# Patient Record
Sex: FEMALE | Race: WHITE | NOT HISPANIC OR LATINO | Employment: UNEMPLOYED | URBAN - NONMETROPOLITAN AREA
[De-identification: names, ages, dates, MRNs, and addresses within clinical notes are randomized per-mention and may not be internally consistent; named-entity substitution may affect disease eponyms.]

---

## 2017-05-10 ENCOUNTER — APPOINTMENT (OUTPATIENT)
Dept: GENERAL RADIOLOGY | Facility: HOSPITAL | Age: 57
End: 2017-05-10

## 2017-05-10 ENCOUNTER — HOSPITAL ENCOUNTER (EMERGENCY)
Facility: HOSPITAL | Age: 57
Discharge: LEFT BEFORE DISCHARGE | End: 2017-05-11
Attending: EMERGENCY MEDICINE | Admitting: EMERGENCY MEDICINE

## 2017-05-10 DIAGNOSIS — J44.9 CHRONIC OBSTRUCTIVE PULMONARY DISEASE, UNSPECIFIED COPD TYPE (HCC): Primary | ICD-10-CM

## 2017-05-10 DIAGNOSIS — M94.0 COSTOCHONDRITIS: ICD-10-CM

## 2017-05-10 LAB
A-A DO2: 21.7 MMHG (ref 0–300)
ALBUMIN SERPL-MCNC: 3.6 G/DL (ref 3.5–5)
ALBUMIN/GLOB SERPL: 1.3 G/DL (ref 1.5–2.5)
ALP SERPL-CCNC: 77 U/L (ref 35–104)
ALT SERPL W P-5'-P-CCNC: 15 U/L (ref 10–36)
ANION GAP SERPL CALCULATED.3IONS-SCNC: 1.2 MMOL/L (ref 3.6–11.2)
ARTERIAL PATENCY WRIST A: ABNORMAL
AST SERPL-CCNC: 18 U/L (ref 10–30)
ATMOSPHERIC PRESS: 728 MMHG
BASE EXCESS BLDA CALC-SCNC: 0.2 MMOL/L
BASOPHILS # BLD AUTO: 0.05 10*3/MM3 (ref 0–0.3)
BASOPHILS NFR BLD AUTO: 0.6 % (ref 0–2)
BDY SITE: ABNORMAL
BILIRUB SERPL-MCNC: 0.3 MG/DL (ref 0.2–1.8)
BODY TEMPERATURE: 98.6 C
BUN BLD-MCNC: 13 MG/DL (ref 7–21)
BUN/CREAT SERPL: 18.3 (ref 7–25)
CALCIUM SPEC-SCNC: 8.8 MG/DL (ref 7.7–10)
CHLORIDE SERPL-SCNC: 111 MMOL/L (ref 99–112)
CK MB SERPL-CCNC: 0.62 NG/ML (ref 0–5)
CK MB SERPL-RTO: 1 % (ref 0–3)
CK SERPL-CCNC: 64 U/L (ref 24–173)
CO2 SERPL-SCNC: 29.8 MMOL/L (ref 24.3–31.9)
COHGB MFR BLD: 8.2 % (ref 0–5)
CREAT BLD-MCNC: 0.71 MG/DL (ref 0.43–1.29)
DEPRECATED RDW RBC AUTO: 43.2 FL (ref 37–54)
EOSINOPHIL # BLD AUTO: 0.24 10*3/MM3 (ref 0–0.7)
EOSINOPHIL NFR BLD AUTO: 2.9 % (ref 0–5)
ERYTHROCYTE [DISTWIDTH] IN BLOOD BY AUTOMATED COUNT: 13 % (ref 11.5–14.5)
GFR SERPL CREATININE-BSD FRML MDRD: 86 ML/MIN/1.73
GLOBULIN UR ELPH-MCNC: 2.7 GM/DL
GLUCOSE BLD-MCNC: 93 MG/DL (ref 70–110)
HCO3 BLDA-SCNC: 24.9 MMOL/L (ref 22–26)
HCT VFR BLD AUTO: 39.2 % (ref 37–47)
HCT VFR BLD CALC: 41 % (ref 37–47)
HGB BLD-MCNC: 12.9 G/DL (ref 12–16)
HGB BLDA-MCNC: 14 G/DL (ref 12–16)
HOROWITZ INDEX BLD+IHG-RTO: 21 %
IMM GRANULOCYTES # BLD: 0.01 10*3/MM3 (ref 0–0.03)
IMM GRANULOCYTES NFR BLD: 0.1 % (ref 0–0.5)
LYMPHOCYTES # BLD AUTO: 3.1 10*3/MM3 (ref 1–3)
LYMPHOCYTES NFR BLD AUTO: 37.1 % (ref 21–51)
MCH RBC QN AUTO: 31.1 PG (ref 27–33)
MCHC RBC AUTO-ENTMCNC: 32.9 G/DL (ref 33–37)
MCV RBC AUTO: 94.5 FL (ref 80–94)
METHGB BLD QL: 0.2 % (ref 0–3)
MODALITY: ABNORMAL
MONOCYTES # BLD AUTO: 0.56 10*3/MM3 (ref 0.1–0.9)
MONOCYTES NFR BLD AUTO: 6.7 % (ref 0–10)
MYOGLOBIN SERPL-MCNC: 25 NG/ML (ref 0–109)
NEUTROPHILS # BLD AUTO: 4.39 10*3/MM3 (ref 1.4–6.5)
NEUTROPHILS NFR BLD AUTO: 52.6 % (ref 30–70)
OSMOLALITY SERPL CALC.SUM OF ELEC: 282.9 MOSM/KG (ref 273–305)
OXYHGB MFR BLDV: 87.5 % (ref 85–100)
PCO2 BLDA: 40.3 MM HG (ref 35–45)
PH BLDA: 7.41 PH UNITS (ref 7.35–7.45)
PLATELET # BLD AUTO: 221 10*3/MM3 (ref 130–400)
PMV BLD AUTO: 9.9 FL (ref 6–10)
PO2 BLDA: 73.1 MM HG (ref 80–100)
POTASSIUM BLD-SCNC: 3.6 MMOL/L (ref 3.5–5.3)
PROT SERPL-MCNC: 6.3 G/DL (ref 6–8)
RBC # BLD AUTO: 4.15 10*6/MM3 (ref 4.2–5.4)
SAO2 % BLDCOA: 95.5 % (ref 90–100)
SODIUM BLD-SCNC: 142 MMOL/L (ref 135–153)
TROPONIN I SERPL-MCNC: <0.006 NG/ML
WBC NRBC COR # BLD: 8.35 10*3/MM3 (ref 4.5–12.5)

## 2017-05-10 PROCEDURE — 83050 HGB METHEMOGLOBIN QUAN: CPT | Performed by: EMERGENCY MEDICINE

## 2017-05-10 PROCEDURE — 93005 ELECTROCARDIOGRAM TRACING: CPT | Performed by: EMERGENCY MEDICINE

## 2017-05-10 PROCEDURE — 71010 XR CHEST 1 VW: CPT | Performed by: RADIOLOGY

## 2017-05-10 PROCEDURE — 25010000002 METHYLPREDNISOLONE PER 125 MG: Performed by: EMERGENCY MEDICINE

## 2017-05-10 PROCEDURE — 84484 ASSAY OF TROPONIN QUANT: CPT | Performed by: EMERGENCY MEDICINE

## 2017-05-10 PROCEDURE — 36600 WITHDRAWAL OF ARTERIAL BLOOD: CPT | Performed by: EMERGENCY MEDICINE

## 2017-05-10 PROCEDURE — 96374 THER/PROPH/DIAG INJ IV PUSH: CPT

## 2017-05-10 PROCEDURE — 99284 EMERGENCY DEPT VISIT MOD MDM: CPT

## 2017-05-10 PROCEDURE — 82553 CREATINE MB FRACTION: CPT | Performed by: EMERGENCY MEDICINE

## 2017-05-10 PROCEDURE — 93010 ELECTROCARDIOGRAM REPORT: CPT | Performed by: INTERNAL MEDICINE

## 2017-05-10 PROCEDURE — 82375 ASSAY CARBOXYHB QUANT: CPT | Performed by: EMERGENCY MEDICINE

## 2017-05-10 PROCEDURE — 71010 HC CHEST PA OR AP: CPT

## 2017-05-10 PROCEDURE — 85025 COMPLETE CBC W/AUTO DIFF WBC: CPT | Performed by: EMERGENCY MEDICINE

## 2017-05-10 PROCEDURE — 82550 ASSAY OF CK (CPK): CPT | Performed by: EMERGENCY MEDICINE

## 2017-05-10 PROCEDURE — 82805 BLOOD GASES W/O2 SATURATION: CPT | Performed by: EMERGENCY MEDICINE

## 2017-05-10 PROCEDURE — 94799 UNLISTED PULMONARY SVC/PX: CPT

## 2017-05-10 PROCEDURE — 80053 COMPREHEN METABOLIC PANEL: CPT | Performed by: EMERGENCY MEDICINE

## 2017-05-10 PROCEDURE — 83874 ASSAY OF MYOGLOBIN: CPT | Performed by: EMERGENCY MEDICINE

## 2017-05-10 PROCEDURE — 94640 AIRWAY INHALATION TREATMENT: CPT

## 2017-05-10 RX ORDER — GABAPENTIN 800 MG/1
300 TABLET ORAL 3 TIMES DAILY
Status: ON HOLD | COMMUNITY
End: 2017-12-16

## 2017-05-10 RX ORDER — ALBUTEROL SULFATE 90 UG/1
2 AEROSOL, METERED RESPIRATORY (INHALATION) EVERY 6 HOURS PRN
Status: ON HOLD | COMMUNITY
End: 2018-06-14

## 2017-05-10 RX ORDER — ASPIRIN 81 MG/1
324 TABLET, CHEWABLE ORAL ONCE
Status: COMPLETED | OUTPATIENT
Start: 2017-05-10 | End: 2017-05-10

## 2017-05-10 RX ORDER — PHENYTOIN SODIUM 100 MG/1
300 CAPSULE, EXTENDED RELEASE ORAL
Status: ON HOLD | COMMUNITY
End: 2018-06-14

## 2017-05-10 RX ORDER — SODIUM CHLORIDE 0.9 % (FLUSH) 0.9 %
10 SYRINGE (ML) INJECTION AS NEEDED
Status: DISCONTINUED | OUTPATIENT
Start: 2017-05-10 | End: 2017-05-11 | Stop reason: HOSPADM

## 2017-05-10 RX ORDER — IPRATROPIUM BROMIDE AND ALBUTEROL SULFATE 2.5; .5 MG/3ML; MG/3ML
3 SOLUTION RESPIRATORY (INHALATION) ONCE
Status: COMPLETED | OUTPATIENT
Start: 2017-05-10 | End: 2017-05-10

## 2017-05-10 RX ORDER — BUDESONIDE AND FORMOTEROL FUMARATE DIHYDRATE 160; 4.5 UG/1; UG/1
2 AEROSOL RESPIRATORY (INHALATION)
Status: ON HOLD | COMMUNITY
End: 2017-12-16

## 2017-05-10 RX ORDER — METHYLPREDNISOLONE SODIUM SUCCINATE 125 MG/2ML
125 INJECTION, POWDER, LYOPHILIZED, FOR SOLUTION INTRAMUSCULAR; INTRAVENOUS ONCE
Status: COMPLETED | OUTPATIENT
Start: 2017-05-10 | End: 2017-05-10

## 2017-05-10 RX ORDER — ALBUTEROL SULFATE 1.25 MG/3ML
1 SOLUTION RESPIRATORY (INHALATION) EVERY 6 HOURS PRN
Status: ON HOLD | COMMUNITY
End: 2017-12-16

## 2017-05-10 RX ADMIN — IPRATROPIUM BROMIDE AND ALBUTEROL SULFATE 3 ML: .5; 3 SOLUTION RESPIRATORY (INHALATION) at 22:28

## 2017-05-10 RX ADMIN — ASPIRIN 324 MG: 81 TABLET, CHEWABLE ORAL at 22:49

## 2017-05-10 RX ADMIN — METHYLPREDNISOLONE SODIUM SUCCINATE 125 MG: 125 INJECTION, POWDER, FOR SOLUTION INTRAMUSCULAR; INTRAVENOUS at 23:57

## 2017-05-11 VITALS
DIASTOLIC BLOOD PRESSURE: 93 MMHG | TEMPERATURE: 98.6 F | SYSTOLIC BLOOD PRESSURE: 119 MMHG | HEART RATE: 73 BPM | RESPIRATION RATE: 18 BRPM | WEIGHT: 176 LBS | OXYGEN SATURATION: 98 % | BODY MASS INDEX: 28.28 KG/M2 | HEIGHT: 66 IN

## 2017-05-11 LAB
CK MB SERPL-CCNC: 0.44 NG/ML (ref 0–5)
CK MB SERPL-RTO: 0.6 % (ref 0–3)
CK SERPL-CCNC: 70 U/L (ref 24–173)
MYOGLOBIN SERPL-MCNC: 22 NG/ML (ref 0–109)
TROPONIN I SERPL-MCNC: <0.006 NG/ML

## 2017-05-11 PROCEDURE — 83874 ASSAY OF MYOGLOBIN: CPT | Performed by: EMERGENCY MEDICINE

## 2017-05-11 PROCEDURE — 96375 TX/PRO/DX INJ NEW DRUG ADDON: CPT

## 2017-05-11 PROCEDURE — 25010000002 KETOROLAC TROMETHAMINE PER 15 MG

## 2017-05-11 PROCEDURE — 82550 ASSAY OF CK (CPK): CPT | Performed by: EMERGENCY MEDICINE

## 2017-05-11 PROCEDURE — 82553 CREATINE MB FRACTION: CPT | Performed by: EMERGENCY MEDICINE

## 2017-05-11 PROCEDURE — 84484 ASSAY OF TROPONIN QUANT: CPT | Performed by: EMERGENCY MEDICINE

## 2017-05-11 RX ORDER — KETOROLAC TROMETHAMINE 30 MG/ML
30 INJECTION, SOLUTION INTRAMUSCULAR; INTRAVENOUS ONCE
Status: COMPLETED | OUTPATIENT
Start: 2017-05-11 | End: 2017-05-11

## 2017-05-11 RX ORDER — KETOROLAC TROMETHAMINE 30 MG/ML
INJECTION, SOLUTION INTRAMUSCULAR; INTRAVENOUS
Status: COMPLETED
Start: 2017-05-11 | End: 2017-05-11

## 2017-05-11 RX ADMIN — KETOROLAC TROMETHAMINE 30 MG: 30 INJECTION, SOLUTION INTRAMUSCULAR; INTRAVENOUS at 00:24

## 2017-08-15 ENCOUNTER — HOSPITAL ENCOUNTER (EMERGENCY)
Facility: HOSPITAL | Age: 57
Discharge: HOME | End: 2017-08-15
Attending: EMERGENCY MEDICINE | Admitting: EMERGENCY MEDICINE

## 2017-08-15 ENCOUNTER — APPOINTMENT (OUTPATIENT)
Dept: GENERAL RADIOLOGY | Facility: HOSPITAL | Age: 57
End: 2017-08-15

## 2017-08-15 VITALS
RESPIRATION RATE: 18 BRPM | HEART RATE: 73 BPM | SYSTOLIC BLOOD PRESSURE: 102 MMHG | WEIGHT: 176 LBS | BODY MASS INDEX: 28.28 KG/M2 | HEIGHT: 66 IN | DIASTOLIC BLOOD PRESSURE: 72 MMHG | OXYGEN SATURATION: 97 % | TEMPERATURE: 98.5 F

## 2017-08-15 DIAGNOSIS — J44.1 COPD WITH ACUTE EXACERBATION (HCC): Primary | ICD-10-CM

## 2017-08-15 LAB
ALBUMIN SERPL-MCNC: 3.2 G/DL (ref 3.5–5)
ALBUMIN/GLOB SERPL: 1.4 G/DL (ref 1.5–2.5)
ALP SERPL-CCNC: 58 U/L (ref 35–104)
ALT SERPL W P-5'-P-CCNC: 14 U/L (ref 10–36)
ANION GAP SERPL CALCULATED.3IONS-SCNC: 2.8 MMOL/L (ref 3.6–11.2)
AST SERPL-CCNC: 14 U/L (ref 10–30)
BACTERIA UR QL AUTO: ABNORMAL /HPF
BASOPHILS # BLD AUTO: 0.04 10*3/MM3 (ref 0–0.3)
BASOPHILS NFR BLD AUTO: 0.6 % (ref 0–2)
BILIRUB SERPL-MCNC: 0.3 MG/DL (ref 0.2–1.8)
BILIRUB UR QL STRIP: NEGATIVE
BUN BLD-MCNC: 11 MG/DL (ref 7–21)
BUN/CREAT SERPL: 15.7 (ref 7–25)
CALCIUM SPEC-SCNC: 7.8 MG/DL (ref 7.7–10)
CHLORIDE SERPL-SCNC: 112 MMOL/L (ref 99–112)
CK MB SERPL-CCNC: 0.35 NG/ML (ref 0–5)
CLARITY UR: ABNORMAL
CO2 SERPL-SCNC: 28.2 MMOL/L (ref 24.3–31.9)
COLOR UR: YELLOW
CREAT BLD-MCNC: 0.7 MG/DL (ref 0.43–1.29)
DEPRECATED RDW RBC AUTO: 45.2 FL (ref 37–54)
EOSINOPHIL # BLD AUTO: 0.17 10*3/MM3 (ref 0–0.7)
EOSINOPHIL NFR BLD AUTO: 2.4 % (ref 0–5)
ERYTHROCYTE [DISTWIDTH] IN BLOOD BY AUTOMATED COUNT: 13.5 % (ref 11.5–14.5)
GFR SERPL CREATININE-BSD FRML MDRD: 87 ML/MIN/1.73
GLOBULIN UR ELPH-MCNC: 2.3 GM/DL
GLUCOSE BLD-MCNC: 101 MG/DL (ref 70–110)
GLUCOSE UR STRIP-MCNC: NEGATIVE MG/DL
HCT VFR BLD AUTO: 41.2 % (ref 37–47)
HGB BLD-MCNC: 13.5 G/DL (ref 12–16)
HGB UR QL STRIP.AUTO: NEGATIVE
HYALINE CASTS UR QL AUTO: ABNORMAL /LPF
IMM GRANULOCYTES # BLD: 0.01 10*3/MM3 (ref 0–0.03)
IMM GRANULOCYTES NFR BLD: 0.1 % (ref 0–0.5)
KETONES UR QL STRIP: NEGATIVE
LEUKOCYTE ESTERASE UR QL STRIP.AUTO: NEGATIVE
LYMPHOCYTES # BLD AUTO: 2.23 10*3/MM3 (ref 1–3)
LYMPHOCYTES NFR BLD AUTO: 31.6 % (ref 21–51)
MCH RBC QN AUTO: 31.3 PG (ref 27–33)
MCHC RBC AUTO-ENTMCNC: 32.8 G/DL (ref 33–37)
MCV RBC AUTO: 95.6 FL (ref 80–94)
MONOCYTES # BLD AUTO: 0.57 10*3/MM3 (ref 0.1–0.9)
MONOCYTES NFR BLD AUTO: 8.1 % (ref 0–10)
NEUTROPHILS # BLD AUTO: 4.03 10*3/MM3 (ref 1.4–6.5)
NEUTROPHILS NFR BLD AUTO: 57.2 % (ref 30–70)
NITRITE UR QL STRIP: NEGATIVE
OSMOLALITY SERPL CALC.SUM OF ELEC: 284.5 MOSM/KG (ref 273–305)
PH UR STRIP.AUTO: 8.5 [PH] (ref 5–8)
PLATELET # BLD AUTO: 223 10*3/MM3 (ref 130–400)
PMV BLD AUTO: 9.8 FL (ref 6–10)
POTASSIUM BLD-SCNC: 3.1 MMOL/L (ref 3.5–5.3)
PROT SERPL-MCNC: 5.5 G/DL (ref 6–8)
PROT UR QL STRIP: ABNORMAL
RBC # BLD AUTO: 4.31 10*6/MM3 (ref 4.2–5.4)
RBC # UR: ABNORMAL /HPF
REF LAB TEST METHOD: ABNORMAL
SODIUM BLD-SCNC: 143 MMOL/L (ref 135–153)
SP GR UR STRIP: 1.02 (ref 1–1.03)
SQUAMOUS #/AREA URNS HPF: ABNORMAL /HPF
TROPONIN I SERPL-MCNC: <0.006 NG/ML
UROBILINOGEN UR QL STRIP: ABNORMAL
WBC NRBC COR # BLD: 7.05 10*3/MM3 (ref 4.5–12.5)
WBC UR QL AUTO: ABNORMAL /HPF

## 2017-08-15 PROCEDURE — 94640 AIRWAY INHALATION TREATMENT: CPT

## 2017-08-15 PROCEDURE — 93005 ELECTROCARDIOGRAM TRACING: CPT | Performed by: EMERGENCY MEDICINE

## 2017-08-15 PROCEDURE — 80053 COMPREHEN METABOLIC PANEL: CPT | Performed by: EMERGENCY MEDICINE

## 2017-08-15 PROCEDURE — 25010000002 HEPARIN FLUSH (PORCINE) 100 UNIT/ML SOLUTION

## 2017-08-15 PROCEDURE — 99285 EMERGENCY DEPT VISIT HI MDM: CPT

## 2017-08-15 PROCEDURE — 71020 HC CHEST PA AND LATERAL: CPT

## 2017-08-15 PROCEDURE — 81001 URINALYSIS AUTO W/SCOPE: CPT | Performed by: EMERGENCY MEDICINE

## 2017-08-15 PROCEDURE — 94799 UNLISTED PULMONARY SVC/PX: CPT

## 2017-08-15 PROCEDURE — 85025 COMPLETE CBC W/AUTO DIFF WBC: CPT | Performed by: EMERGENCY MEDICINE

## 2017-08-15 PROCEDURE — 84484 ASSAY OF TROPONIN QUANT: CPT | Performed by: EMERGENCY MEDICINE

## 2017-08-15 PROCEDURE — 96374 THER/PROPH/DIAG INJ IV PUSH: CPT

## 2017-08-15 PROCEDURE — 82553 CREATINE MB FRACTION: CPT | Performed by: EMERGENCY MEDICINE

## 2017-08-15 PROCEDURE — 71020 XR CHEST 2 VW: CPT | Performed by: RADIOLOGY

## 2017-08-15 RX ORDER — DICYCLOMINE HCL 20 MG
20 TABLET ORAL EVERY 6 HOURS
Status: ON HOLD | COMMUNITY
End: 2017-12-16

## 2017-08-15 RX ORDER — NAPROXEN 500 MG/1
500 TABLET ORAL 2 TIMES DAILY PRN
Qty: 14 TABLET | Refills: 0 | Status: ON HOLD | OUTPATIENT
Start: 2017-08-15 | End: 2017-12-16

## 2017-08-15 RX ORDER — METOCLOPRAMIDE 10 MG/1
10 TABLET ORAL
Status: ON HOLD | COMMUNITY
End: 2017-12-16

## 2017-08-15 RX ORDER — NAPROXEN 250 MG/1
500 TABLET ORAL ONCE
Status: COMPLETED | OUTPATIENT
Start: 2017-08-15 | End: 2017-08-15

## 2017-08-15 RX ORDER — ZOLPIDEM TARTRATE 10 MG/1
5 TABLET ORAL NIGHTLY PRN
Status: ON HOLD | COMMUNITY
End: 2017-12-16

## 2017-08-15 RX ORDER — CEFDINIR 300 MG/1
300 CAPSULE ORAL 2 TIMES DAILY
Status: ON HOLD | COMMUNITY
End: 2017-12-16

## 2017-08-15 RX ORDER — CLONAZEPAM 0.5 MG/1
0.5 TABLET ORAL 2 TIMES DAILY
COMMUNITY

## 2017-08-15 RX ORDER — ROPINIROLE 1 MG/1
1 TABLET, FILM COATED ORAL NIGHTLY
COMMUNITY

## 2017-08-15 RX ORDER — IPRATROPIUM BROMIDE AND ALBUTEROL SULFATE 2.5; .5 MG/3ML; MG/3ML
3 SOLUTION RESPIRATORY (INHALATION) ONCE
Status: COMPLETED | OUTPATIENT
Start: 2017-08-15 | End: 2017-08-15

## 2017-08-15 RX ORDER — SODIUM CHLORIDE 0.9 % (FLUSH) 0.9 %
10 SYRINGE (ML) INJECTION AS NEEDED
Status: DISCONTINUED | OUTPATIENT
Start: 2017-08-15 | End: 2017-08-15 | Stop reason: HOSPADM

## 2017-08-15 RX ADMIN — NAPROXEN 500 MG: 250 TABLET ORAL at 20:44

## 2017-08-15 RX ADMIN — HEPARIN SODIUM (PORCINE) LOCK FLUSH IV SOLN 100 UNIT/ML 300 UNITS: 100 SOLUTION at 21:32

## 2017-08-15 RX ADMIN — IPRATROPIUM BROMIDE AND ALBUTEROL SULFATE 3 ML: .5; 3 SOLUTION RESPIRATORY (INHALATION) at 19:38

## 2017-08-15 NOTE — ED NOTES
EKG performed by me @ 1910 and shown to Dr. Maria @ 1912.     [place] Evaristo Unger  08/15/17 1931

## 2017-08-16 NOTE — ED NOTES
Patient is leaving ED with family at this time, patient's port deaccessed prior to departure. Patient has no further needs or questions at discharge, patient verbalizes understanding of discharge instructions and importance of follow up care. Patient is alert and oriented x4, respirations are regular and unlabored, NADN.     Matthew Wilkerson RN  08/15/17 9242

## 2017-08-16 NOTE — ED PROVIDER NOTES
Subjective   Patient is a 56 y.o. female presenting with shortness of breath.   History provided by:  Patient  Shortness of Breath   Severity:  Moderate  Onset quality:  Gradual  Duration:  3 days  Progression:  Worsening  Chronicity:  Recurrent  Context: activity    Associated symptoms: chest pain, cough and wheezing    Associated symptoms: no abdominal pain and no fever        Review of Systems   Constitutional: Negative.  Negative for fever.   HENT: Negative.    Respiratory: Positive for cough, shortness of breath and wheezing.    Cardiovascular: Positive for chest pain.   Gastrointestinal: Negative.  Negative for abdominal pain.   Endocrine: Negative.    Genitourinary: Negative.  Negative for dysuria.   Skin: Negative.    Neurological: Negative.    Psychiatric/Behavioral: Negative.    All other systems reviewed and are negative.      Past Medical History:   Diagnosis Date   • COPD (chronic obstructive pulmonary disease)    • Diabetes mellitus    • Emphysema of lung    • Emphysema of lung        No Known Allergies    History reviewed. No pertinent surgical history.    History reviewed. No pertinent family history.    Social History     Social History   • Marital status: Single     Spouse name: N/A   • Number of children: N/A   • Years of education: N/A     Social History Main Topics   • Smoking status: Current Every Day Smoker     Packs/day: 0.50     Years: 25.00     Types: Cigarettes   • Smokeless tobacco: None   • Alcohol use No   • Drug use: No   • Sexual activity: Not Asked     Other Topics Concern   • None     Social History Narrative           Objective   Physical Exam   Constitutional: She is oriented to person, place, and time. She appears well-developed and well-nourished. No distress.   HENT:   Head: Normocephalic and atraumatic.   Right Ear: External ear normal.   Left Ear: External ear normal.   Nose: Nose normal.   Eyes: Conjunctivae and EOM are normal. Pupils are equal, round, and reactive to light.    Neck: Normal range of motion. Neck supple. No JVD present. No tracheal deviation present.   Cardiovascular: Normal rate, regular rhythm and normal heart sounds.    No murmur heard.  Pulmonary/Chest: She is in respiratory distress. She has wheezes. She exhibits tenderness.   Abdominal: Soft. Bowel sounds are normal. There is no tenderness.   Musculoskeletal: Normal range of motion. She exhibits no edema or deformity.   Neurological: She is alert and oriented to person, place, and time. No cranial nerve deficit.   Skin: Skin is warm and dry. No rash noted. She is not diaphoretic. No erythema. No pallor.   Psychiatric: She has a normal mood and affect. Her behavior is normal. Thought content normal.   Nursing note and vitals reviewed.      Procedures         ED Course  ED Course                  MDM    Final diagnoses:   COPD with acute exacerbation            Steven Naranjo MD  08/15/17 4319

## 2017-10-28 ENCOUNTER — HOSPITAL ENCOUNTER (EMERGENCY)
Facility: HOSPITAL | Age: 57
Discharge: HOME | End: 2017-10-28
Attending: FAMILY MEDICINE | Admitting: FAMILY MEDICINE

## 2017-10-28 ENCOUNTER — APPOINTMENT (OUTPATIENT)
Dept: GENERAL RADIOLOGY | Facility: HOSPITAL | Age: 57
End: 2017-10-28

## 2017-10-28 VITALS
SYSTOLIC BLOOD PRESSURE: 116 MMHG | TEMPERATURE: 98.4 F | RESPIRATION RATE: 18 BRPM | OXYGEN SATURATION: 97 % | DIASTOLIC BLOOD PRESSURE: 85 MMHG | BODY MASS INDEX: 28.28 KG/M2 | HEART RATE: 82 BPM | HEIGHT: 66 IN | WEIGHT: 176 LBS

## 2017-10-28 DIAGNOSIS — J44.1 COPD WITH EXACERBATION (HCC): Primary | ICD-10-CM

## 2017-10-28 LAB
ALBUMIN SERPL-MCNC: 4.3 G/DL (ref 3.5–5)
ALBUMIN/GLOB SERPL: 1.3 G/DL (ref 1.5–2.5)
ALP SERPL-CCNC: 86 U/L (ref 35–104)
ALT SERPL W P-5'-P-CCNC: 18 U/L (ref 10–36)
ANION GAP SERPL CALCULATED.3IONS-SCNC: 7.1 MMOL/L (ref 3.6–11.2)
AST SERPL-CCNC: 19 U/L (ref 10–30)
BASOPHILS # BLD AUTO: 0.02 10*3/MM3 (ref 0–0.3)
BASOPHILS NFR BLD AUTO: 0.3 % (ref 0–2)
BILIRUB SERPL-MCNC: 0.2 MG/DL (ref 0.2–1.8)
BNP SERPL-MCNC: 18 PG/ML (ref 0–100)
BUN BLD-MCNC: 10 MG/DL (ref 7–21)
BUN/CREAT SERPL: 10.9 (ref 7–25)
CALCIUM SPEC-SCNC: 9.2 MG/DL (ref 7.7–10)
CHLORIDE SERPL-SCNC: 107 MMOL/L (ref 99–112)
CO2 SERPL-SCNC: 24.9 MMOL/L (ref 24.3–31.9)
CREAT BLD-MCNC: 0.92 MG/DL (ref 0.43–1.29)
CRP SERPL-MCNC: 0.6 MG/DL (ref 0–0.99)
D-LACTATE SERPL-SCNC: 1 MMOL/L (ref 0.5–2)
DEPRECATED RDW RBC AUTO: 44 FL (ref 37–54)
EOSINOPHIL # BLD AUTO: 0 10*3/MM3 (ref 0–0.7)
EOSINOPHIL NFR BLD AUTO: 0 % (ref 0–5)
ERYTHROCYTE [DISTWIDTH] IN BLOOD BY AUTOMATED COUNT: 13.2 % (ref 11.5–14.5)
ERYTHROCYTE [SEDIMENTATION RATE] IN BLOOD: 11 MM/HR (ref 0–30)
GFR SERPL CREATININE-BSD FRML MDRD: 63 ML/MIN/1.73
GLOBULIN UR ELPH-MCNC: 3.3 GM/DL
GLUCOSE BLD-MCNC: 116 MG/DL (ref 70–110)
HCT VFR BLD AUTO: 45 % (ref 37–47)
HGB BLD-MCNC: 15.1 G/DL (ref 12–16)
IMM GRANULOCYTES # BLD: 0.03 10*3/MM3 (ref 0–0.03)
IMM GRANULOCYTES NFR BLD: 0.4 % (ref 0–0.5)
LYMPHOCYTES # BLD AUTO: 1.54 10*3/MM3 (ref 1–3)
LYMPHOCYTES NFR BLD AUTO: 22 % (ref 21–51)
MCH RBC QN AUTO: 31.4 PG (ref 27–33)
MCHC RBC AUTO-ENTMCNC: 33.6 G/DL (ref 33–37)
MCV RBC AUTO: 93.6 FL (ref 80–94)
MONOCYTES # BLD AUTO: 0.36 10*3/MM3 (ref 0.1–0.9)
MONOCYTES NFR BLD AUTO: 5.1 % (ref 0–10)
NEUTROPHILS # BLD AUTO: 5.06 10*3/MM3 (ref 1.4–6.5)
NEUTROPHILS NFR BLD AUTO: 72.2 % (ref 30–70)
OSMOLALITY SERPL CALC.SUM OF ELEC: 277.6 MOSM/KG (ref 273–305)
PLATELET # BLD AUTO: 273 10*3/MM3 (ref 130–400)
PMV BLD AUTO: 9.4 FL (ref 6–10)
POTASSIUM BLD-SCNC: 4.3 MMOL/L (ref 3.5–5.3)
PROT SERPL-MCNC: 7.6 G/DL (ref 6–8)
RBC # BLD AUTO: 4.81 10*6/MM3 (ref 4.2–5.4)
SODIUM BLD-SCNC: 139 MMOL/L (ref 135–153)
TROPONIN I SERPL-MCNC: <0.006 NG/ML
WBC NRBC COR # BLD: 7.01 10*3/MM3 (ref 4.5–12.5)

## 2017-10-28 PROCEDURE — 99285 EMERGENCY DEPT VISIT HI MDM: CPT

## 2017-10-28 PROCEDURE — 94799 UNLISTED PULMONARY SVC/PX: CPT

## 2017-10-28 PROCEDURE — 85025 COMPLETE CBC W/AUTO DIFF WBC: CPT | Performed by: FAMILY MEDICINE

## 2017-10-28 PROCEDURE — 96374 THER/PROPH/DIAG INJ IV PUSH: CPT

## 2017-10-28 PROCEDURE — 85652 RBC SED RATE AUTOMATED: CPT | Performed by: FAMILY MEDICINE

## 2017-10-28 PROCEDURE — 93010 ELECTROCARDIOGRAM REPORT: CPT | Performed by: INTERNAL MEDICINE

## 2017-10-28 PROCEDURE — 71010 XR CHEST 1 VW: CPT | Performed by: RADIOLOGY

## 2017-10-28 PROCEDURE — 83880 ASSAY OF NATRIURETIC PEPTIDE: CPT | Performed by: FAMILY MEDICINE

## 2017-10-28 PROCEDURE — 80053 COMPREHEN METABOLIC PANEL: CPT | Performed by: FAMILY MEDICINE

## 2017-10-28 PROCEDURE — 86140 C-REACTIVE PROTEIN: CPT | Performed by: FAMILY MEDICINE

## 2017-10-28 PROCEDURE — 94640 AIRWAY INHALATION TREATMENT: CPT

## 2017-10-28 PROCEDURE — 71010 HC CHEST PA OR AP: CPT

## 2017-10-28 PROCEDURE — 83605 ASSAY OF LACTIC ACID: CPT | Performed by: FAMILY MEDICINE

## 2017-10-28 PROCEDURE — 25010000002 METHYLPREDNISOLONE PER 125 MG: Performed by: FAMILY MEDICINE

## 2017-10-28 PROCEDURE — 87040 BLOOD CULTURE FOR BACTERIA: CPT | Performed by: FAMILY MEDICINE

## 2017-10-28 PROCEDURE — 93005 ELECTROCARDIOGRAM TRACING: CPT | Performed by: FAMILY MEDICINE

## 2017-10-28 PROCEDURE — 84484 ASSAY OF TROPONIN QUANT: CPT | Performed by: FAMILY MEDICINE

## 2017-10-28 RX ORDER — GUAIFENESIN AND CODEINE PHOSPHATE 100; 10 MG/5ML; MG/5ML
10 SOLUTION ORAL 4 TIMES DAILY PRN
Qty: 200 ML | Refills: 0 | Status: ON HOLD | OUTPATIENT
Start: 2017-10-28 | End: 2017-12-16

## 2017-10-28 RX ORDER — PREDNISONE 20 MG/1
20 TABLET ORAL 2 TIMES DAILY
Qty: 8 TABLET | Refills: 0 | Status: SHIPPED | OUTPATIENT
Start: 2017-10-28 | End: 2017-11-01

## 2017-10-28 RX ORDER — CODEINE PHOSPHATE AND GUAIFENESIN 10; 100 MG/5ML; MG/5ML
10 SOLUTION ORAL ONCE
Status: COMPLETED | OUTPATIENT
Start: 2017-10-28 | End: 2017-10-28

## 2017-10-28 RX ORDER — DOXYCYCLINE 100 MG/1
CAPSULE ORAL
Status: COMPLETED
Start: 2017-10-28 | End: 2017-10-28

## 2017-10-28 RX ORDER — IPRATROPIUM BROMIDE AND ALBUTEROL SULFATE 2.5; .5 MG/3ML; MG/3ML
3 SOLUTION RESPIRATORY (INHALATION) EVERY 4 HOURS PRN
Qty: 360 ML | Refills: 0 | Status: ON HOLD | OUTPATIENT
Start: 2017-10-28 | End: 2017-12-16

## 2017-10-28 RX ORDER — CETIRIZINE HYDROCHLORIDE 10 MG/1
10 TABLET ORAL ONCE
Status: COMPLETED | OUTPATIENT
Start: 2017-10-28 | End: 2017-10-28

## 2017-10-28 RX ORDER — METHYLPREDNISOLONE SODIUM SUCCINATE 125 MG/2ML
125 INJECTION, POWDER, LYOPHILIZED, FOR SOLUTION INTRAMUSCULAR; INTRAVENOUS ONCE
Status: COMPLETED | OUTPATIENT
Start: 2017-10-28 | End: 2017-10-28

## 2017-10-28 RX ORDER — IPRATROPIUM BROMIDE AND ALBUTEROL SULFATE 2.5; .5 MG/3ML; MG/3ML
3 SOLUTION RESPIRATORY (INHALATION) ONCE
Status: DISCONTINUED | OUTPATIENT
Start: 2017-10-28 | End: 2017-10-28 | Stop reason: HOSPADM

## 2017-10-28 RX ORDER — DOXYCYCLINE 100 MG/1
100 CAPSULE ORAL 2 TIMES DAILY
Qty: 20 CAPSULE | Refills: 0 | Status: SHIPPED | OUTPATIENT
Start: 2017-10-28 | End: 2017-11-07

## 2017-10-28 RX ORDER — IPRATROPIUM BROMIDE AND ALBUTEROL SULFATE 2.5; .5 MG/3ML; MG/3ML
3 SOLUTION RESPIRATORY (INHALATION) ONCE
Status: COMPLETED | OUTPATIENT
Start: 2017-10-28 | End: 2017-10-28

## 2017-10-28 RX ORDER — SODIUM CHLORIDE 0.9 % (FLUSH) 0.9 %
10 SYRINGE (ML) INJECTION AS NEEDED
Status: DISCONTINUED | OUTPATIENT
Start: 2017-10-28 | End: 2017-10-28 | Stop reason: HOSPADM

## 2017-10-28 RX ORDER — MONTELUKAST SODIUM 10 MG/1
10 TABLET ORAL NIGHTLY
Status: DISCONTINUED | OUTPATIENT
Start: 2017-10-28 | End: 2017-10-28 | Stop reason: HOSPADM

## 2017-10-28 RX ORDER — DOXYCYCLINE 100 MG/1
100 CAPSULE ORAL EVERY 12 HOURS SCHEDULED
Status: DISCONTINUED | OUTPATIENT
Start: 2017-10-28 | End: 2017-10-28 | Stop reason: HOSPADM

## 2017-10-28 RX ADMIN — IPRATROPIUM BROMIDE AND ALBUTEROL SULFATE 3 ML: .5; 3 SOLUTION RESPIRATORY (INHALATION) at 19:44

## 2017-10-28 RX ADMIN — METHYLPREDNISOLONE SODIUM SUCCINATE 125 MG: 125 INJECTION, POWDER, FOR SOLUTION INTRAMUSCULAR; INTRAVENOUS at 18:48

## 2017-10-28 RX ADMIN — CETIRIZINE HYDROCHLORIDE 10 MG: 10 TABLET ORAL at 18:48

## 2017-10-28 RX ADMIN — IPRATROPIUM BROMIDE AND ALBUTEROL SULFATE 3 ML: .5; 3 SOLUTION RESPIRATORY (INHALATION) at 18:30

## 2017-10-28 RX ADMIN — DOXYCYCLINE 100 MG: 100 CAPSULE ORAL at 20:34

## 2017-10-28 RX ADMIN — GUAIFENESIN AND CODEINE PHOSPHATE 10 ML: 100; 10 SOLUTION ORAL at 20:34

## 2017-10-28 RX ADMIN — MONTELUKAST SODIUM 10 MG: 10 TABLET ORAL at 20:43

## 2017-10-28 NOTE — ED NOTES
Called the lab, spoke with Alex. Advised that we need blood cultures. He states ok, advised that there are labels in the room.      Heather Symes  10/28/17 1953

## 2017-10-28 NOTE — ED PROVIDER NOTES
Subjective   History of Present Illness  58 y/o F here w/ one week of worsening SOA. Pt states h/o COPD and wears O2 via NC continuously. Pt states she normally wears 2L NC but has recently turned her O2 up to 3L NC. No CP or fevers/chills. Pt has been using her duoneb more since symptoms started.   Review of Systems   Constitutional: Negative for chills, fatigue and fever.   Eyes: Negative for photophobia and visual disturbance.   Respiratory: Positive for cough, shortness of breath and wheezing. Negative for choking and chest tightness.    Cardiovascular: Negative for chest pain and palpitations.   Gastrointestinal: Negative for abdominal distention and abdominal pain.   Genitourinary: Negative for difficulty urinating and dysuria.   Musculoskeletal: Negative for back pain and neck pain.   Skin: Negative for color change and pallor.   Neurological: Negative for dizziness, syncope, speech difficulty, numbness and headaches.       Past Medical History:   Diagnosis Date   • COPD (chronic obstructive pulmonary disease)    • Diabetes mellitus    • Emphysema of lung    • Emphysema of lung        No Known Allergies    History reviewed. No pertinent surgical history.    History reviewed. No pertinent family history.    Social History     Social History   • Marital status: Single     Spouse name: N/A   • Number of children: N/A   • Years of education: N/A     Social History Main Topics   • Smoking status: Current Every Day Smoker     Packs/day: 0.50     Years: 25.00     Types: Cigarettes   • Smokeless tobacco: None   • Alcohol use No   • Drug use: No   • Sexual activity: Not Asked     Other Topics Concern   • None     Social History Narrative           Objective   Physical Exam   Constitutional: She is oriented to person, place, and time. She appears well-developed and well-nourished. She is active.   HENT:   Head: Normocephalic and atraumatic.   Right Ear: Hearing and external ear normal.   Left Ear: Hearing and external  ear normal.   Nose: Nose normal.   Mouth/Throat: Uvula is midline, oropharynx is clear and moist and mucous membranes are normal.   Eyes: Conjunctivae, EOM and lids are normal. Pupils are equal, round, and reactive to light.   Neck: Trachea normal, normal range of motion, full passive range of motion without pain and phonation normal. Neck supple.   Cardiovascular: Normal rate, regular rhythm and normal heart sounds.    Pulmonary/Chest: Effort normal. She has decreased breath sounds. She has wheezes. She has no rhonchi.   Abdominal: Soft. Normal appearance. She exhibits no distension. There is no tenderness. There is no rigidity and no guarding.   Neurological: She is alert and oriented to person, place, and time. GCS eye subscore is 4. GCS verbal subscore is 5. GCS motor subscore is 6.   Skin: Skin is warm, dry and intact.   Psychiatric: She has a normal mood and affect. Her speech is normal and behavior is normal. Cognition and memory are normal.   Nursing note and vitals reviewed.      Procedures         ED Course  ED Course   Value Comment By Time   ECG 12 Lead Sinus rhythm, 106 bpm. QTc 438ms. No ST segment abnormalities concerning for STEMI. Sherman Gaffney MD 10/28 1827      CXR grossly WNL. HEART score of 3. Pt states improvement after IV steroids and duonebs. Pt given PO steroids and duoneb prescriptions to go home with along with abx prescription. Pt with no desat while in the ED.             MDM  Number of Diagnoses or Management Options  COPD with exacerbation: new and requires workup     Amount and/or Complexity of Data Reviewed  Clinical lab tests: reviewed and ordered  Tests in the radiology section of CPT®: reviewed and ordered  Tests in the medicine section of CPT®: reviewed and ordered  Independent visualization of images, tracings, or specimens: yes    Risk of Complications, Morbidity, and/or Mortality  Presenting problems: high  Diagnostic procedures: high  Management options:  high    Patient Progress  Patient progress: stable      Final diagnoses:   COPD with exacerbation            Sherman Gaffney MD  10/28/17 2049

## 2017-10-29 NOTE — ED NOTES
PT IS AAO X4 PT HAS NO SIGNS OF DISTRESS BREATHING IS EQUAL AND UNLABORED SKIN PWD     Thalia Alberts, ANDRIY  10/28/17 4514

## 2017-11-02 LAB
BACTERIA SPEC AEROBE CULT: NORMAL
BACTERIA SPEC AEROBE CULT: NORMAL

## 2017-12-16 ENCOUNTER — HOSPITAL ENCOUNTER (OUTPATIENT)
Facility: HOSPITAL | Age: 57
Setting detail: OBSERVATION
Discharge: LEFT BEFORE DISCHARGE | End: 2017-12-17
Attending: FAMILY MEDICINE | Admitting: INTERNAL MEDICINE

## 2017-12-16 ENCOUNTER — APPOINTMENT (OUTPATIENT)
Dept: CARDIOLOGY | Facility: HOSPITAL | Age: 57
End: 2017-12-16

## 2017-12-16 ENCOUNTER — APPOINTMENT (OUTPATIENT)
Dept: GENERAL RADIOLOGY | Facility: HOSPITAL | Age: 57
End: 2017-12-16

## 2017-12-16 DIAGNOSIS — R07.9 CHEST PAIN, UNSPECIFIED TYPE: Primary | ICD-10-CM

## 2017-12-16 DIAGNOSIS — J44.9 CHRONIC OBSTRUCTIVE PULMONARY DISEASE, UNSPECIFIED COPD TYPE (HCC): ICD-10-CM

## 2017-12-16 DIAGNOSIS — R03.0 ELEVATED BLOOD PRESSURE READING: ICD-10-CM

## 2017-12-16 LAB
A-A DO2: 17.8 MMHG (ref 0–300)
ALBUMIN SERPL-MCNC: 3.8 G/DL (ref 3.5–5)
ALBUMIN/GLOB SERPL: 1.5 G/DL (ref 1.5–2.5)
ALP SERPL-CCNC: 75 U/L (ref 35–104)
ALT SERPL W P-5'-P-CCNC: 13 U/L (ref 10–36)
ALT SERPL W P-5'-P-CCNC: 13 U/L (ref 10–36)
ANION GAP SERPL CALCULATED.3IONS-SCNC: 3.6 MMOL/L (ref 3.6–11.2)
APTT PPP: 20 SECONDS (ref 23.8–36.1)
ARTERIAL PATENCY WRIST A: ABNORMAL
AST SERPL-CCNC: 15 U/L (ref 10–30)
ATMOSPHERIC PRESS: 722 MMHG
B-HCG UR QL: NEGATIVE
BASE EXCESS BLDA CALC-SCNC: -0.1 MMOL/L
BASOPHILS # BLD AUTO: 0.01 10*3/MM3 (ref 0–0.3)
BASOPHILS NFR BLD AUTO: 0.1 % (ref 0–2)
BDY SITE: ABNORMAL
BH CV ECHO MEAS - % IVS THICK: 23.3 %
BH CV ECHO MEAS - % LVPW THICK: 37.9 %
BH CV ECHO MEAS - ACS: 2.1 CM
BH CV ECHO MEAS - AO ROOT AREA (BSA CORRECTED): 1.7
BH CV ECHO MEAS - AO ROOT AREA: 8.4 CM^2
BH CV ECHO MEAS - AO ROOT DIAM: 3.3 CM
BH CV ECHO MEAS - BSA(HAYCOCK): 1.9 M^2
BH CV ECHO MEAS - BSA: 1.9 M^2
BH CV ECHO MEAS - BZI_BMI: 29.3 KILOGRAMS/M^2
BH CV ECHO MEAS - BZI_METRIC_HEIGHT: 165.1 CM
BH CV ECHO MEAS - BZI_METRIC_WEIGHT: 79.8 KG
BH CV ECHO MEAS - CONTRAST EF 4CH: 73.7 ML/M^2
BH CV ECHO MEAS - EDV(CUBED): 191.8 ML
BH CV ECHO MEAS - EDV(MOD-SP4): 38 ML
BH CV ECHO MEAS - EDV(TEICH): 164.4 ML
BH CV ECHO MEAS - EF(CUBED): 66.8 %
BH CV ECHO MEAS - EF(TEICH): 57.6 %
BH CV ECHO MEAS - ESV(CUBED): 63.7 ML
BH CV ECHO MEAS - ESV(MOD-SP4): 10 ML
BH CV ECHO MEAS - ESV(TEICH): 69.7 ML
BH CV ECHO MEAS - FS: 30.8 %
BH CV ECHO MEAS - IVS/LVPW: 1
BH CV ECHO MEAS - IVSD: 1.2 CM
BH CV ECHO MEAS - IVSS: 1.5 CM
BH CV ECHO MEAS - LV DIASTOLIC VOL/BSA (35-75): 20.3 ML/M^2
BH CV ECHO MEAS - LV MASS(C)D: 290.9 GRAMS
BH CV ECHO MEAS - LV MASS(C)DI: 155.3 GRAMS/M^2
BH CV ECHO MEAS - LV MASS(C)S: 245.2 GRAMS
BH CV ECHO MEAS - LV MASS(C)SI: 130.9 GRAMS/M^2
BH CV ECHO MEAS - LV SYSTOLIC VOL/BSA (12-30): 5.3 ML/M^2
BH CV ECHO MEAS - LVIDD: 5.8 CM
BH CV ECHO MEAS - LVIDS: 4 CM
BH CV ECHO MEAS - LVLD AP4: 6.2 CM
BH CV ECHO MEAS - LVLS AP4: 4.6 CM
BH CV ECHO MEAS - LVPWD: 1.2 CM
BH CV ECHO MEAS - LVPWS: 1.6 CM
BH CV ECHO MEAS - MV A MAX VEL: 84.4 CM/SEC
BH CV ECHO MEAS - MV E MAX VEL: 67.6 CM/SEC
BH CV ECHO MEAS - MV E/A: 0.8
BH CV ECHO MEAS - RVDD: 1.2 CM
BH CV ECHO MEAS - SI(CUBED): 68.4 ML/M^2
BH CV ECHO MEAS - SI(MOD-SP4): 14.9 ML/M^2
BH CV ECHO MEAS - SI(TEICH): 50.6 ML/M^2
BH CV ECHO MEAS - SV(CUBED): 128.2 ML
BH CV ECHO MEAS - SV(MOD-SP4): 28 ML
BH CV ECHO MEAS - SV(TEICH): 94.7 ML
BILIRUB SERPL-MCNC: 0.1 MG/DL (ref 0.2–1.8)
BILIRUB UR QL STRIP: NEGATIVE
BNP SERPL-MCNC: 74 PG/ML (ref 0–100)
BODY TEMPERATURE: 98.6 C
BUN BLD-MCNC: 14 MG/DL (ref 7–21)
BUN/CREAT SERPL: 15.7 (ref 7–25)
CALCIUM SPEC-SCNC: 8.7 MG/DL (ref 7.7–10)
CHLORIDE SERPL-SCNC: 107 MMOL/L (ref 99–112)
CLARITY UR: CLEAR
CO2 SERPL-SCNC: 29.4 MMOL/L (ref 24.3–31.9)
COHGB MFR BLD: 7 % (ref 0–5)
COLOR UR: YELLOW
CREAT BLD-MCNC: 0.89 MG/DL (ref 0.43–1.29)
CRP SERPL-MCNC: <0.5 MG/DL (ref 0–0.99)
D-LACTATE SERPL-SCNC: 2.3 MMOL/L (ref 0.5–2)
D-LACTATE SERPL-SCNC: 3.4 MMOL/L (ref 0.5–2)
DEPRECATED RDW RBC AUTO: 47.2 FL (ref 37–54)
EOSINOPHIL # BLD AUTO: 0.07 10*3/MM3 (ref 0–0.7)
EOSINOPHIL NFR BLD AUTO: 0.8 % (ref 0–5)
ERYTHROCYTE [DISTWIDTH] IN BLOOD BY AUTOMATED COUNT: 13.8 % (ref 11.5–14.5)
FLUAV AG NPH QL: NEGATIVE
FLUBV AG NPH QL IA: NEGATIVE
GFR SERPL CREATININE-BSD FRML MDRD: 66 ML/MIN/1.73
GLOBULIN UR ELPH-MCNC: 2.5 GM/DL
GLUCOSE BLD-MCNC: 224 MG/DL (ref 70–110)
GLUCOSE BLDC GLUCOMTR-MCNC: 158 MG/DL (ref 70–130)
GLUCOSE BLDC GLUCOMTR-MCNC: 166 MG/DL (ref 70–130)
GLUCOSE BLDC GLUCOMTR-MCNC: 170 MG/DL (ref 70–130)
GLUCOSE UR STRIP-MCNC: NEGATIVE MG/DL
HBA1C MFR BLD: 6.1 % (ref 4.5–5.7)
HBV SURFACE AB SER RIA-ACNC: REACTIVE
HBV SURFACE AG SERPL QL IA: NORMAL
HCO3 BLDA-SCNC: 25.1 MMOL/L (ref 22–26)
HCT VFR BLD AUTO: 40.5 % (ref 37–47)
HCT VFR BLD CALC: 42 % (ref 37–47)
HCV AB SER DONR QL: REACTIVE
HEMOLYSIS INDEX: 4
HGB BLD-MCNC: 13.1 G/DL (ref 12–16)
HGB BLDA-MCNC: 14.3 G/DL (ref 12–16)
HGB UR QL STRIP.AUTO: NEGATIVE
HIV1+2 AB SER QL: NORMAL
HOLD SPECIMEN: NORMAL
HOROWITZ INDEX BLD+IHG-RTO: 21 %
IMM GRANULOCYTES # BLD: 0.05 10*3/MM3 (ref 0–0.03)
IMM GRANULOCYTES NFR BLD: 0.6 % (ref 0–0.5)
INR PPP: 0.77 (ref 0.9–1.1)
KETONES UR QL STRIP: NEGATIVE
L PNEUMO1 AG UR QL IA: NEGATIVE
LEUKOCYTE ESTERASE UR QL STRIP.AUTO: NEGATIVE
LIPASE SERPL-CCNC: 57 U/L (ref 13–60)
LYMPHOCYTES # BLD AUTO: 0.65 10*3/MM3 (ref 1–3)
LYMPHOCYTES NFR BLD AUTO: 7.8 % (ref 21–51)
MAXIMAL PREDICTED HEART RATE: 165 BPM
MCH RBC QN AUTO: 31.2 PG (ref 27–33)
MCHC RBC AUTO-ENTMCNC: 32.3 G/DL (ref 33–37)
MCV RBC AUTO: 96.4 FL (ref 80–94)
METHGB BLD QL: 0.2 % (ref 0–3)
MODALITY: ABNORMAL
MONOCYTES # BLD AUTO: 0.14 10*3/MM3 (ref 0.1–0.9)
MONOCYTES NFR BLD AUTO: 1.7 % (ref 0–10)
NEUTROPHILS # BLD AUTO: 7.46 10*3/MM3 (ref 1.4–6.5)
NEUTROPHILS NFR BLD AUTO: 89 % (ref 30–70)
NITRITE UR QL STRIP: NEGATIVE
OSMOLALITY SERPL CALC.SUM OF ELEC: 286.8 MOSM/KG (ref 273–305)
OXYHGB MFR BLDV: 88.8 % (ref 85–100)
PCO2 BLDA: 42.9 MM HG (ref 35–45)
PH BLDA: 7.38 PH UNITS (ref 7.35–7.45)
PH UR STRIP.AUTO: 6.5 [PH] (ref 5–8)
PHENYTOIN SERPL-MCNC: <0.5 MCG/ML (ref 10–20)
PLATELET # BLD AUTO: 208 10*3/MM3 (ref 130–400)
PMV BLD AUTO: 9.9 FL (ref 6–10)
PO2 BLDA: 72.6 MM HG (ref 80–100)
POTASSIUM BLD-SCNC: 4.1 MMOL/L (ref 3.5–5.3)
PROT SERPL-MCNC: 6.3 G/DL (ref 6–8)
PROT UR QL STRIP: NEGATIVE
PROTHROMBIN TIME: 10.8 SECONDS (ref 11–15.4)
RBC # BLD AUTO: 4.2 10*6/MM3 (ref 4.2–5.4)
SAO2 % BLDCOA: 95.7 % (ref 90–100)
SODIUM BLD-SCNC: 140 MMOL/L (ref 135–153)
SP GR UR STRIP: 1.01 (ref 1–1.03)
STRESS TARGET HR: 140 BPM
TROPONIN I SERPL-MCNC: 0.01 NG/ML
TROPONIN I SERPL-MCNC: <0.006 NG/ML
TSH SERPL DL<=0.05 MIU/L-ACNC: 0.08 MIU/ML (ref 0.55–4.78)
UROBILINOGEN UR QL STRIP: NORMAL
WBC NRBC COR # BLD: 8.38 10*3/MM3 (ref 4.5–12.5)

## 2017-12-16 PROCEDURE — 83690 ASSAY OF LIPASE: CPT | Performed by: FAMILY MEDICINE

## 2017-12-16 PROCEDURE — 86803 HEPATITIS C AB TEST: CPT | Performed by: INTERNAL MEDICINE

## 2017-12-16 PROCEDURE — 83036 HEMOGLOBIN GLYCOSYLATED A1C: CPT | Performed by: PHYSICIAN ASSISTANT

## 2017-12-16 PROCEDURE — 93005 ELECTROCARDIOGRAM TRACING: CPT | Performed by: PHYSICIAN ASSISTANT

## 2017-12-16 PROCEDURE — 99220 PR INITIAL OBSERVATION CARE/DAY 70 MINUTES: CPT | Performed by: HOSPITALIST

## 2017-12-16 PROCEDURE — 83605 ASSAY OF LACTIC ACID: CPT | Performed by: FAMILY MEDICINE

## 2017-12-16 PROCEDURE — G0378 HOSPITAL OBSERVATION PER HR: HCPCS

## 2017-12-16 PROCEDURE — 71010 HC CHEST PA OR AP: CPT

## 2017-12-16 PROCEDURE — 94799 UNLISTED PULMONARY SVC/PX: CPT

## 2017-12-16 PROCEDURE — 93308 TTE F-UP OR LMTD: CPT

## 2017-12-16 PROCEDURE — 86706 HEP B SURFACE ANTIBODY: CPT | Performed by: INTERNAL MEDICINE

## 2017-12-16 PROCEDURE — 87340 HEPATITIS B SURFACE AG IA: CPT | Performed by: INTERNAL MEDICINE

## 2017-12-16 PROCEDURE — 81025 URINE PREGNANCY TEST: CPT | Performed by: FAMILY MEDICINE

## 2017-12-16 PROCEDURE — 96367 TX/PROPH/DG ADDL SEQ IV INF: CPT

## 2017-12-16 PROCEDURE — 84443 ASSAY THYROID STIM HORMONE: CPT | Performed by: PHYSICIAN ASSISTANT

## 2017-12-16 PROCEDURE — 82962 GLUCOSE BLOOD TEST: CPT

## 2017-12-16 PROCEDURE — 99285 EMERGENCY DEPT VISIT HI MDM: CPT

## 2017-12-16 PROCEDURE — 83880 ASSAY OF NATRIURETIC PEPTIDE: CPT | Performed by: FAMILY MEDICINE

## 2017-12-16 PROCEDURE — 25010000002 METHYLPREDNISOLONE PER 125 MG: Performed by: FAMILY MEDICINE

## 2017-12-16 PROCEDURE — 71010 XR CHEST 1 VW: CPT | Performed by: RADIOLOGY

## 2017-12-16 PROCEDURE — 93005 ELECTROCARDIOGRAM TRACING: CPT | Performed by: FAMILY MEDICINE

## 2017-12-16 PROCEDURE — 94640 AIRWAY INHALATION TREATMENT: CPT

## 2017-12-16 PROCEDURE — 96376 TX/PRO/DX INJ SAME DRUG ADON: CPT

## 2017-12-16 PROCEDURE — 25010000002 ENOXAPARIN PER 10 MG: Performed by: PHYSICIAN ASSISTANT

## 2017-12-16 PROCEDURE — 96372 THER/PROPH/DIAG INJ SC/IM: CPT

## 2017-12-16 PROCEDURE — 25010000002 CEFTRIAXONE PER 250 MG: Performed by: FAMILY MEDICINE

## 2017-12-16 PROCEDURE — 82805 BLOOD GASES W/O2 SATURATION: CPT | Performed by: FAMILY MEDICINE

## 2017-12-16 PROCEDURE — 87040 BLOOD CULTURE FOR BACTERIA: CPT | Performed by: FAMILY MEDICINE

## 2017-12-16 PROCEDURE — 25010000002 ONDANSETRON PER 1 MG: Performed by: FAMILY MEDICINE

## 2017-12-16 PROCEDURE — 85025 COMPLETE CBC W/AUTO DIFF WBC: CPT | Performed by: FAMILY MEDICINE

## 2017-12-16 PROCEDURE — 84460 ALANINE AMINO (ALT) (SGPT): CPT | Performed by: INTERNAL MEDICINE

## 2017-12-16 PROCEDURE — 87804 INFLUENZA ASSAY W/OPTIC: CPT | Performed by: FAMILY MEDICINE

## 2017-12-16 PROCEDURE — 82375 ASSAY CARBOXYHB QUANT: CPT | Performed by: FAMILY MEDICINE

## 2017-12-16 PROCEDURE — 87449 NOS EACH ORGANISM AG IA: CPT | Performed by: PHYSICIAN ASSISTANT

## 2017-12-16 PROCEDURE — 25010000002 MORPHINE PER 10 MG: Performed by: FAMILY MEDICINE

## 2017-12-16 PROCEDURE — 84484 ASSAY OF TROPONIN QUANT: CPT | Performed by: FAMILY MEDICINE

## 2017-12-16 PROCEDURE — 80185 ASSAY OF PHENYTOIN TOTAL: CPT | Performed by: PHYSICIAN ASSISTANT

## 2017-12-16 PROCEDURE — 85610 PROTHROMBIN TIME: CPT | Performed by: FAMILY MEDICINE

## 2017-12-16 PROCEDURE — 80053 COMPREHEN METABOLIC PANEL: CPT | Performed by: FAMILY MEDICINE

## 2017-12-16 PROCEDURE — 96365 THER/PROPH/DIAG IV INF INIT: CPT

## 2017-12-16 PROCEDURE — 63710000001 INSULIN ASPART PER 5 UNITS: Performed by: PHYSICIAN ASSISTANT

## 2017-12-16 PROCEDURE — 85730 THROMBOPLASTIN TIME PARTIAL: CPT | Performed by: FAMILY MEDICINE

## 2017-12-16 PROCEDURE — 86140 C-REACTIVE PROTEIN: CPT | Performed by: FAMILY MEDICINE

## 2017-12-16 PROCEDURE — G0432 EIA HIV-1/HIV-2 SCREEN: HCPCS | Performed by: INTERNAL MEDICINE

## 2017-12-16 PROCEDURE — 83050 HGB METHEMOGLOBIN QUAN: CPT | Performed by: FAMILY MEDICINE

## 2017-12-16 PROCEDURE — 93321 DOPPLER ECHO F-UP/LMTD STD: CPT

## 2017-12-16 PROCEDURE — 36600 WITHDRAWAL OF ARTERIAL BLOOD: CPT | Performed by: FAMILY MEDICINE

## 2017-12-16 PROCEDURE — 96375 TX/PRO/DX INJ NEW DRUG ADDON: CPT

## 2017-12-16 PROCEDURE — 81003 URINALYSIS AUTO W/O SCOPE: CPT | Performed by: FAMILY MEDICINE

## 2017-12-16 PROCEDURE — 84484 ASSAY OF TROPONIN QUANT: CPT | Performed by: PHYSICIAN ASSISTANT

## 2017-12-16 PROCEDURE — 87899 AGENT NOS ASSAY W/OPTIC: CPT | Performed by: PHYSICIAN ASSISTANT

## 2017-12-16 PROCEDURE — 36415 COLL VENOUS BLD VENIPUNCTURE: CPT

## 2017-12-16 RX ORDER — METOCLOPRAMIDE 10 MG/1
10 TABLET ORAL
Status: CANCELLED | OUTPATIENT
Start: 2017-12-16

## 2017-12-16 RX ORDER — ROPINIROLE 1 MG/1
1 TABLET, FILM COATED ORAL NIGHTLY
Status: DISCONTINUED | OUTPATIENT
Start: 2017-12-16 | End: 2017-12-17 | Stop reason: HOSPADM

## 2017-12-16 RX ORDER — PHENYTOIN SODIUM 100 MG/1
300 CAPSULE, EXTENDED RELEASE ORAL DAILY
Status: DISCONTINUED | OUTPATIENT
Start: 2017-12-16 | End: 2017-12-17 | Stop reason: HOSPADM

## 2017-12-16 RX ORDER — PHENYTOIN SODIUM 100 MG/1
300 CAPSULE, EXTENDED RELEASE ORAL DAILY
Status: CANCELLED | OUTPATIENT
Start: 2017-12-16 | End: 2017-12-19

## 2017-12-16 RX ORDER — ASPIRIN 81 MG/1
324 TABLET, CHEWABLE ORAL ONCE
Status: COMPLETED | OUTPATIENT
Start: 2017-12-16 | End: 2017-12-16

## 2017-12-16 RX ORDER — ALBUTEROL SULFATE 2.5 MG/3ML
2.5 SOLUTION RESPIRATORY (INHALATION) EVERY 6 HOURS PRN
Status: ON HOLD | COMMUNITY
End: 2018-06-14

## 2017-12-16 RX ORDER — ALBUTEROL SULFATE 90 UG/1
2 AEROSOL, METERED RESPIRATORY (INHALATION) EVERY 4 HOURS PRN
Status: CANCELLED | OUTPATIENT
Start: 2017-12-16

## 2017-12-16 RX ORDER — IPRATROPIUM BROMIDE AND ALBUTEROL SULFATE 2.5; .5 MG/3ML; MG/3ML
3 SOLUTION RESPIRATORY (INHALATION)
Status: DISCONTINUED | OUTPATIENT
Start: 2017-12-16 | End: 2017-12-16

## 2017-12-16 RX ORDER — ASPIRIN 81 MG/1
81 TABLET ORAL DAILY
Status: DISCONTINUED | OUTPATIENT
Start: 2017-12-16 | End: 2017-12-16

## 2017-12-16 RX ORDER — MORPHINE SULFATE 2 MG/ML
2 INJECTION, SOLUTION INTRAMUSCULAR; INTRAVENOUS ONCE
Status: COMPLETED | OUTPATIENT
Start: 2017-12-16 | End: 2017-12-16

## 2017-12-16 RX ORDER — PANTOPRAZOLE SODIUM 40 MG/1
40 TABLET, DELAYED RELEASE ORAL EVERY MORNING
Status: DISCONTINUED | OUTPATIENT
Start: 2017-12-17 | End: 2017-12-17 | Stop reason: HOSPADM

## 2017-12-16 RX ORDER — METHYLPREDNISOLONE SODIUM SUCCINATE 125 MG/2ML
125 INJECTION, POWDER, LYOPHILIZED, FOR SOLUTION INTRAMUSCULAR; INTRAVENOUS ONCE
Status: COMPLETED | OUTPATIENT
Start: 2017-12-16 | End: 2017-12-16

## 2017-12-16 RX ORDER — ALUMINA, MAGNESIA, AND SIMETHICONE 2400; 2400; 240 MG/30ML; MG/30ML; MG/30ML
15 SUSPENSION ORAL EVERY 6 HOURS PRN
Status: DISCONTINUED | OUTPATIENT
Start: 2017-12-16 | End: 2017-12-16

## 2017-12-16 RX ORDER — L.ACID,CASEI/B.ANIMAL/S.THERMO 16B CELL
1 CAPSULE ORAL DAILY
Status: DISCONTINUED | OUTPATIENT
Start: 2017-12-16 | End: 2017-12-17 | Stop reason: HOSPADM

## 2017-12-16 RX ORDER — NITROGLYCERIN 0.4 MG/1
0.4 TABLET SUBLINGUAL
Status: CANCELLED | OUTPATIENT
Start: 2017-12-16

## 2017-12-16 RX ORDER — DEXTROSE MONOHYDRATE 25 G/50ML
25 INJECTION, SOLUTION INTRAVENOUS
Status: DISCONTINUED | OUTPATIENT
Start: 2017-12-16 | End: 2017-12-17 | Stop reason: HOSPADM

## 2017-12-16 RX ORDER — ASPIRIN 81 MG/1
81 TABLET ORAL DAILY
Status: CANCELLED | OUTPATIENT
Start: 2017-12-16

## 2017-12-16 RX ORDER — ATORVASTATIN CALCIUM 10 MG/1
10 TABLET, FILM COATED ORAL DAILY
Status: DISCONTINUED | OUTPATIENT
Start: 2017-12-16 | End: 2017-12-17 | Stop reason: HOSPADM

## 2017-12-16 RX ORDER — DOXYCYCLINE 100 MG/1
100 CAPSULE ORAL EVERY 12 HOURS SCHEDULED
Status: DISCONTINUED | OUTPATIENT
Start: 2017-12-16 | End: 2017-12-17 | Stop reason: HOSPADM

## 2017-12-16 RX ORDER — CLONAZEPAM 0.5 MG/1
0.5 TABLET ORAL 2 TIMES DAILY PRN
Status: DISCONTINUED | OUTPATIENT
Start: 2017-12-16 | End: 2017-12-17 | Stop reason: HOSPADM

## 2017-12-16 RX ORDER — ROPINIROLE 1 MG/1
1 TABLET, FILM COATED ORAL NIGHTLY
Status: CANCELLED | OUTPATIENT
Start: 2017-12-16

## 2017-12-16 RX ORDER — OMEPRAZOLE 20 MG/1
20 CAPSULE, DELAYED RELEASE ORAL DAILY
Status: ON HOLD | COMMUNITY
End: 2018-06-14

## 2017-12-16 RX ORDER — ZOLPIDEM TARTRATE 5 MG/1
5 TABLET ORAL NIGHTLY PRN
Status: CANCELLED | OUTPATIENT
Start: 2017-12-16

## 2017-12-16 RX ORDER — GABAPENTIN 100 MG/1
100 CAPSULE ORAL EVERY 12 HOURS SCHEDULED
Status: CANCELLED | OUTPATIENT
Start: 2017-12-16

## 2017-12-16 RX ORDER — BUDESONIDE AND FORMOTEROL FUMARATE DIHYDRATE 80; 4.5 UG/1; UG/1
2 AEROSOL RESPIRATORY (INHALATION)
Status: CANCELLED | OUTPATIENT
Start: 2017-12-16

## 2017-12-16 RX ORDER — ALBUTEROL SULFATE 2.5 MG/3ML
2.5 SOLUTION RESPIRATORY (INHALATION) EVERY 6 HOURS PRN
Status: CANCELLED | OUTPATIENT
Start: 2017-12-16

## 2017-12-16 RX ORDER — ASPIRIN 81 MG/1
81 TABLET ORAL DAILY
Status: ON HOLD | COMMUNITY
End: 2018-06-14

## 2017-12-16 RX ORDER — IPRATROPIUM BROMIDE AND ALBUTEROL SULFATE 2.5; .5 MG/3ML; MG/3ML
3 SOLUTION RESPIRATORY (INHALATION) ONCE
Status: COMPLETED | OUTPATIENT
Start: 2017-12-16 | End: 2017-12-16

## 2017-12-16 RX ORDER — PREDNISONE 20 MG/1
40 TABLET ORAL
Status: DISCONTINUED | OUTPATIENT
Start: 2017-12-17 | End: 2017-12-17 | Stop reason: HOSPADM

## 2017-12-16 RX ORDER — CLOPIDOGREL BISULFATE 75 MG/1
75 TABLET ORAL DAILY
Status: DISCONTINUED | OUTPATIENT
Start: 2017-12-16 | End: 2017-12-16

## 2017-12-16 RX ORDER — DICYCLOMINE HCL 20 MG
20 TABLET ORAL EVERY 6 HOURS
Status: CANCELLED | OUTPATIENT
Start: 2017-12-16

## 2017-12-16 RX ORDER — NICOTINE POLACRILEX 4 MG
15 LOZENGE BUCCAL
Status: DISCONTINUED | OUTPATIENT
Start: 2017-12-16 | End: 2017-12-17 | Stop reason: HOSPADM

## 2017-12-16 RX ORDER — GABAPENTIN 300 MG/1
300 CAPSULE ORAL 3 TIMES DAILY
COMMUNITY

## 2017-12-16 RX ORDER — ZOLPIDEM TARTRATE 5 MG/1
5 TABLET ORAL NIGHTLY
COMMUNITY

## 2017-12-16 RX ORDER — ASPIRIN 81 MG/1
81 TABLET ORAL DAILY
Status: DISCONTINUED | OUTPATIENT
Start: 2017-12-17 | End: 2017-12-17 | Stop reason: HOSPADM

## 2017-12-16 RX ORDER — GABAPENTIN 300 MG/1
300 CAPSULE ORAL 3 TIMES DAILY
Status: DISCONTINUED | OUTPATIENT
Start: 2017-12-16 | End: 2017-12-17 | Stop reason: HOSPADM

## 2017-12-16 RX ORDER — LISINOPRIL 10 MG/1
10 TABLET ORAL DAILY
COMMUNITY

## 2017-12-16 RX ORDER — ONDANSETRON 2 MG/ML
4 INJECTION INTRAMUSCULAR; INTRAVENOUS EVERY 6 HOURS PRN
Status: DISCONTINUED | OUTPATIENT
Start: 2017-12-16 | End: 2017-12-17 | Stop reason: HOSPADM

## 2017-12-16 RX ORDER — LISINOPRIL 10 MG/1
10 TABLET ORAL DAILY
Status: DISCONTINUED | OUTPATIENT
Start: 2017-12-16 | End: 2017-12-17 | Stop reason: HOSPADM

## 2017-12-16 RX ORDER — IPRATROPIUM BROMIDE AND ALBUTEROL SULFATE 2.5; .5 MG/3ML; MG/3ML
3 SOLUTION RESPIRATORY (INHALATION)
Status: DISCONTINUED | OUTPATIENT
Start: 2017-12-16 | End: 2017-12-16 | Stop reason: SDUPTHER

## 2017-12-16 RX ORDER — SODIUM CHLORIDE 0.9 % (FLUSH) 0.9 %
10 SYRINGE (ML) INJECTION AS NEEDED
Status: DISCONTINUED | OUTPATIENT
Start: 2017-12-16 | End: 2017-12-17 | Stop reason: HOSPADM

## 2017-12-16 RX ORDER — IPRATROPIUM BROMIDE AND ALBUTEROL SULFATE 2.5; .5 MG/3ML; MG/3ML
SOLUTION RESPIRATORY (INHALATION)
Status: COMPLETED
Start: 2017-12-16 | End: 2017-12-16

## 2017-12-16 RX ORDER — NICOTINE 21 MG/24HR
1 PATCH, TRANSDERMAL 24 HOURS TRANSDERMAL ONCE
Status: COMPLETED | OUTPATIENT
Start: 2017-12-16 | End: 2017-12-17

## 2017-12-16 RX ORDER — SODIUM CHLORIDE 0.9 % (FLUSH) 0.9 %
1-10 SYRINGE (ML) INJECTION AS NEEDED
Status: DISCONTINUED | OUTPATIENT
Start: 2017-12-16 | End: 2017-12-17 | Stop reason: HOSPADM

## 2017-12-16 RX ORDER — CODEINE PHOSPHATE AND GUAIFENESIN 10; 100 MG/5ML; MG/5ML
10 SOLUTION ORAL 4 TIMES DAILY PRN
Status: CANCELLED | OUTPATIENT
Start: 2017-12-16

## 2017-12-16 RX ORDER — BUDESONIDE AND FORMOTEROL FUMARATE DIHYDRATE 160; 4.5 UG/1; UG/1
2 AEROSOL RESPIRATORY (INHALATION)
Status: CANCELLED | OUTPATIENT
Start: 2017-12-16

## 2017-12-16 RX ORDER — ACETAMINOPHEN 325 MG/1
650 TABLET ORAL EVERY 6 HOURS PRN
Status: DISCONTINUED | OUTPATIENT
Start: 2017-12-16 | End: 2017-12-17 | Stop reason: HOSPADM

## 2017-12-16 RX ORDER — SODIUM CHLORIDE 0.9 % (FLUSH) 0.9 %
10 SYRINGE (ML) INJECTION AS NEEDED
Status: DISCONTINUED | OUTPATIENT
Start: 2017-12-16 | End: 2017-12-16

## 2017-12-16 RX ORDER — METHYLPREDNISOLONE SODIUM SUCCINATE 40 MG/ML
40 INJECTION, POWDER, LYOPHILIZED, FOR SOLUTION INTRAMUSCULAR; INTRAVENOUS EVERY 8 HOURS
Status: DISCONTINUED | OUTPATIENT
Start: 2017-12-16 | End: 2017-12-16

## 2017-12-16 RX ORDER — ONDANSETRON 2 MG/ML
4 INJECTION INTRAMUSCULAR; INTRAVENOUS ONCE
Status: COMPLETED | OUTPATIENT
Start: 2017-12-16 | End: 2017-12-16

## 2017-12-16 RX ORDER — ZOLPIDEM TARTRATE 5 MG/1
5 TABLET ORAL NIGHTLY PRN
Status: DISCONTINUED | OUTPATIENT
Start: 2017-12-16 | End: 2017-12-17 | Stop reason: HOSPADM

## 2017-12-16 RX ORDER — NITROGLYCERIN 0.4 MG/1
0.4 TABLET SUBLINGUAL
Status: ON HOLD | COMMUNITY
End: 2017-12-16

## 2017-12-16 RX ORDER — IPRATROPIUM BROMIDE AND ALBUTEROL SULFATE 2.5; .5 MG/3ML; MG/3ML
3 SOLUTION RESPIRATORY (INHALATION)
Status: DISCONTINUED | OUTPATIENT
Start: 2017-12-16 | End: 2017-12-17 | Stop reason: HOSPADM

## 2017-12-16 RX ORDER — CLONAZEPAM 0.5 MG/1
0.5 TABLET ORAL 2 TIMES DAILY PRN
Status: CANCELLED | OUTPATIENT
Start: 2017-12-16

## 2017-12-16 RX ADMIN — PHENYTOIN SODIUM 300 MG: 100 CAPSULE, EXTENDED RELEASE ORAL at 20:11

## 2017-12-16 RX ADMIN — ROPINIROLE HYDROCHLORIDE 1 MG: 1 TABLET, FILM COATED ORAL at 20:11

## 2017-12-16 RX ADMIN — GABAPENTIN 300 MG: 300 CAPSULE ORAL at 21:09

## 2017-12-16 RX ADMIN — NICOTINE 1 PATCH: 21 PATCH TRANSDERMAL at 06:17

## 2017-12-16 RX ADMIN — MORPHINE SULFATE 2 MG: 2 INJECTION, SOLUTION INTRAMUSCULAR; INTRAVENOUS at 05:03

## 2017-12-16 RX ADMIN — ASPIRIN 324 MG: 81 TABLET, CHEWABLE ORAL at 04:58

## 2017-12-16 RX ADMIN — MORPHINE SULFATE 2 MG: 2 INJECTION, SOLUTION INTRAMUSCULAR; INTRAVENOUS at 07:28

## 2017-12-16 RX ADMIN — DOXYCYCLINE 100 MG: 100 INJECTION, POWDER, LYOPHILIZED, FOR SOLUTION INTRAVENOUS at 06:07

## 2017-12-16 RX ADMIN — IPRATROPIUM BROMIDE AND ALBUTEROL SULFATE 3 ML: .5; 3 SOLUTION RESPIRATORY (INHALATION) at 19:01

## 2017-12-16 RX ADMIN — ATORVASTATIN CALCIUM 10 MG: 10 TABLET, FILM COATED ORAL at 15:00

## 2017-12-16 RX ADMIN — CEFTRIAXONE 2 G: 2 INJECTION, SOLUTION INTRAVENOUS at 05:29

## 2017-12-16 RX ADMIN — ENOXAPARIN SODIUM 40 MG: 40 INJECTION SUBCUTANEOUS at 14:59

## 2017-12-16 RX ADMIN — METOPROLOL TARTRATE 25 MG: 25 TABLET, FILM COATED ORAL at 15:00

## 2017-12-16 RX ADMIN — CLONAZEPAM 0.5 MG: 0.5 TABLET ORAL at 17:05

## 2017-12-16 RX ADMIN — GABAPENTIN 300 MG: 300 CAPSULE ORAL at 17:05

## 2017-12-16 RX ADMIN — IPRATROPIUM BROMIDE AND ALBUTEROL SULFATE 3 ML: .5; 3 SOLUTION RESPIRATORY (INHALATION) at 06:55

## 2017-12-16 RX ADMIN — DOXYCYCLINE 100 MG: 100 CAPSULE ORAL at 21:10

## 2017-12-16 RX ADMIN — ASPIRIN 324 MG: 81 TABLET, CHEWABLE ORAL at 16:46

## 2017-12-16 RX ADMIN — IPRATROPIUM BROMIDE AND ALBUTEROL SULFATE 3 ML: .5; 3 SOLUTION RESPIRATORY (INHALATION) at 04:22

## 2017-12-16 RX ADMIN — LISINOPRIL 10 MG: 10 TABLET ORAL at 20:10

## 2017-12-16 RX ADMIN — INSULIN ASPART 2 UNITS: 100 INJECTION, SOLUTION INTRAVENOUS; SUBCUTANEOUS at 17:05

## 2017-12-16 RX ADMIN — IPRATROPIUM BROMIDE AND ALBUTEROL SULFATE 3 ML: 2.5; .5 SOLUTION RESPIRATORY (INHALATION) at 04:22

## 2017-12-16 RX ADMIN — METHYLPREDNISOLONE SODIUM SUCCINATE 125 MG: 125 INJECTION, POWDER, FOR SOLUTION INTRAMUSCULAR; INTRAVENOUS at 05:03

## 2017-12-16 RX ADMIN — ZOLPIDEM TARTRATE 5 MG: 5 TABLET ORAL at 21:09

## 2017-12-16 RX ADMIN — ONDANSETRON 4 MG: 2 INJECTION, SOLUTION INTRAMUSCULAR; INTRAVENOUS at 05:04

## 2017-12-16 RX ADMIN — ACETAMINOPHEN 650 MG: 325 TABLET ORAL at 21:10

## 2017-12-16 NOTE — PLAN OF CARE
Problem: Diabetes, Type 2 (Adult)  Goal: Signs and Symptoms of Listed Potential Problems Will be Absent or Manageable (Diabetes, Type 2)  Outcome: Ongoing (interventions implemented as appropriate)    12/16/17 1027   Diabetes, Type 2   Problems Assessed (Type 2 Diabetes) all   Problems Present (Type 2 Diabetes) none         Problem: Pain, Acute (Adult)  Goal: Identify Related Risk Factors and Signs and Symptoms  Outcome: Ongoing (interventions implemented as appropriate)  Goal: Acceptable Pain Control/Comfort Level  Outcome: Ongoing (interventions implemented as appropriate)    Problem: Acute Coronary Syndrome (ACS) (Adult)  Goal: Signs and Symptoms of Listed Potential Problems Will be Absent or Manageable (Acute Coronary Syndrome)  Outcome: Ongoing (interventions implemented as appropriate)    Problem: Cardiac Output, Decreased (Adult)  Goal: Identify Related Risk Factors and Signs and Symptoms  Outcome: Ongoing (interventions implemented as appropriate)  Goal: Adequate Cardiac Output/Effective Tissue Perfusion  Outcome: Ongoing (interventions implemented as appropriate)

## 2017-12-16 NOTE — ED NOTES
Unsuccessful attempt to regain IV access x 3 in L forearm, L upper arm, and R AC.     Thalia Correa RN  12/16/17 7350

## 2017-12-16 NOTE — CONSULTS
Referring Provider: -    Primary care provider-unknown    Reason for Consultation: -Chest pain    Chief complaint     Chest pain and shortness of breath      History of present illness:      57-year-old woman with history of COPD has been admitted with history of increasing shortness of breath associated with cough with the diagnosis of possible pneumonia and exacerbation of COPD.    I have been consulted because of chest pain.  She has history of chest pain for the last few months.  She said that she visited emergency department at least 3 times with history of chest pain in the recent past.  She was seen by cardiologist at [place], [place] and she was unable to perform stress test because of COPD.  Her chest pain is retrosternal, pressure-like, moderate in intensity, exertional, with radiation to the neck and left shoulder, not associated with diaphoresis.  First set of cardiac marker-negative and ECG was normal.    She has history of intermittent palpitations and occasional dizziness.  No history of syncope.  She has history of dyspnea on exertion due to COPD.  No leg edema.    No history of known cardiac illness.  No history of known prior MI, CAD or CHF.    Cardiac risk factors-hypertension, hyperlipidemia, DM type II on smoking.  She also has family history of heart disease.    Review of Systems     Constitutional-tiredness next an ENT-none  Cardiovascular-as above  Pituitary-as above  GI-stomach problem  Endocrine-DM 2 and lipid disorder  Musculoskeletal-pain syndrome  Psychiatric-anxiety  Review of father organ system involvement-negative    History  Past Medical History:   Diagnosis Date   • Arthritis    • Asthma    • Chest pain    • COPD (chronic obstructive pulmonary disease)    • Depression    • Diabetes mellitus    • Emphysema of lung    • Emphysema of lung    • Injury of back    • Migraine    • Pneumonia    • Seizures    • Stroke    , Past Surgical History:   Procedure Laterality Date   •  "KIDNEY SURGERY      cyst removal   • PORTACATH PLACEMENT      left subclavian   • SINUS SURGERY     , History reviewed. No pertinent family history., Social History   Substance Use Topics   • Smoking status: Current Every Day Smoker     Packs/day: 0.50     Years: 25.00     Types: Cigarettes   • Smokeless tobacco: Never Used   • Alcohol use No   ,     Medication Review:      aspirin 324 mg Oral Once   And      [START ON 12/17/2017] aspirin 81 mg Oral Daily   atorvastatin 10 mg Oral Daily   [START ON 12/17/2017] ceftriaxone 1 g Intravenous Q24H   doxycycline 100 mg Intravenous Q12H   enoxaparin 40 mg Subcutaneous Q24H   insulin aspart 0-7 Units Subcutaneous 4x Daily AC & at Bedtime   ipratropium-albuterol 3 mL Nebulization 4x Daily - RT   methylPREDNISolone sodium succinate 40 mg Intravenous Q8H   metoprolol tartrate 25 mg Oral Q12H   nicotine 1 patch Transdermal Once   Risaquad-2 1 capsule Oral Daily        Allergies:  Review of patient's allergies indicates no known allergies.    Objective     Vital Sign Min/Max for last 24 hours  Temp  Min: 97.7 °F (36.5 °C)  Max: 97.9 °F (36.6 °C)   BP  Min: 106/79  Max: 144/87   Pulse  Min: 92  Max: 121   Resp  Min: 17  Max: 28   SpO2  Min: 91 %  Max: 98 %   No Data Recorded   Weight  Min: 79.8 kg (176 lb)  Max: 79.8 kg (176 lb)     Flowsheet Rows         First Filed Value    Admission Height  165.1 cm (65\") Documented at 12/16/2017 0358    Admission Weight  79.8 kg (176 lb) Documented at 12/16/2017 0358             Physical Exam:     General Appearance:    Alert, cooperative, in no acute distress   Head:    Normocephalic, without obvious abnormality, atraumatic   Eyes:            Lids and lashes normal, conjunctivae and sclerae normal, no   icterus, no pallor, corneas clear, PERRLA   Ears:    Ears appear intact with no abnormalities noted   Throat:   No oral lesions, no thrush, oral mucosa moist   Neck:   No adenopathy, supple, trachea midline, no thyromegaly, no   carotid " bruit, no JVD   Back:     No kyphosis present, no scoliosis present, no skin lesions,      erythema or scars, no tenderness to percussion or                   palpation,   range of motion normal   Lungs:     Bilateral equal air entry.  Bilateral wheezes were heard.      Heart:    Regular rhythm and normal rate, normal S1 and S2, no            murmur, no gallop, no rub, no click   Chest Wall:    No abnormalities observed   Abdomen:     Normal bowel sounds, no masses, no organomegaly, soft        non-tender, non-distended, no guarding, no rebound                tenderness   Rectal:     Deferred   Extremities:   Moves all extremities well, no edema, no cyanosis, no             redness   Pulses:   Pulses palpable and equal bilaterally   Skin:   No bleeding, bruising or rash   Lymph nodes:   No palpable adenopathy   Neurologic:   Cranial nerves 2 - 12 grossly intact, sensation intact, DTR       present and equal bilaterally       Telemetry  Normal sinus rhythm.  Heart rate 90-1 100 bpm    ECG  ECG/EMG Results (last 24 hours)     Procedure Component Value Units Date/Time    ECG 12 Lead [[id number]] Collected:  12/16/17 0456     Updated:  12/16/17 0937    Narrative:       Test Reason : soa  Blood Pressure : **/** mmHG  Vent. Rate : 106 BPM     Atrial Rate : 106 BPM     P-R Int : 136 ms          QRS Dur : 076 ms      QT Int : 332 ms       P-R-T Axes : 076 -29 050 degrees     QTc Int : 441 ms    Sinus tachycardia  Possible Left atrial enlargement  Borderline ECG  Confirmed by Oswaldo Sparks ([id number]) on 12/16/2017 9:37:03 AM    Referred By:  SALNIA           Confirmed By:Oswaldo Sparks    ECG 12 Lead [[id number]] Collected:  12/16/17 1430     Updated:  12/16/17 1431    Narrative:       Test Reason : chest pain  Blood Pressure : **/** mmHG  Vent. Rate : 097 BPM     Atrial Rate : 097 BPM     P-R Int : 132 ms          QRS Dur : 076 ms      QT Int : 338 ms       P-R-T Axes : 083 -20 045 degrees     QTc Int : 429 ms    Normal sinus  rhythm  Normal ECG  When compared with ECG of 16-DEC-2017 04:56,  No significant change was found    Referred By:  BRETT           Confirmed By:     Adult Transthoracic Echo Limited W/ Cont if Necessary Per Protocol [[id number]] Collected:  12/16/17 1133     Updated:  12/16/17 1507     BSA 1.9 m^2       CV ECHO JONAH - RVDD 1.2 cm      IVSd 1.2 cm      IVSs 1.5 cm      LVIDd 5.8 cm      LVIDs 4.0 cm      LVPWd 1.2 cm       CV ECHO JONAH - LVPWS 1.6 cm      IVS/LVPW 1.0     FS 30.8 %      EDV(Teich) 164.4 ml      ESV(Teich) 69.7 ml      EF(Teich) 57.6 %      EDV(cubed) 191.8 ml      ESV(cubed) 63.7 ml      EF(cubed) 66.8 %      % IVS thick 23.3 %      % LVPW thick 37.9 %      LV mass(C)d 290.9 grams      LV mass(C)dI 155.3 grams/m^2      LV mass(C)s 245.2 grams      LV mass(C)sI 130.9 grams/m^2      SV(Teich) 94.7 ml      SI(Teich) 50.6 ml/m^2      SV(cubed) 128.2 ml      SI(cubed) 68.4 ml/m^2      Ao root diam 3.3 cm      Ao root area 8.4 cm^2      ACS 2.1 cm      LVLd ap4 6.2 cm      EDV(MOD-sp4) 38.0 ml      LVLs ap4 4.6 cm      ESV(MOD-sp4) 10.0 ml      SV(MOD-sp4) 28.0 ml      SI(MOD-sp4) 14.9 ml/m^2      Ao root area (BSA corrected) 1.7     CONTRAST EF 4CH 73.7 ml/m^2      LV Diastolic corrected for BSA 20.3 ml/m^2      LV Systolic corrected for BSA 5.3 ml/m^2      MV E max yajaira 67.6 cm/sec      MV A max yajaira 84.4 cm/sec      MV E/A 0.8      CV ECHO JONAH - BZI_BMI 29.3 kilograms/m^2       CV ECHO JONAH - BSA(HAYCOCK) 1.9 m^2       CV ECHO JONAH - BZI_METRIC_WEIGHT 79.8 kg       CV ECHO JONAH - BZI_METRIC_HEIGHT 165.1 cm      Target HR (85%) 140 bpm      Max. Pred. HR (100%) 165 bpm     Narrative:       · This is a limited echocardiographic study. No Doppler and color-flow   imaging studies are done  · The left ventricular cavity is mildly dilated.  · Left ventricular systolic function is normal. Estimated EF = 55-60%.  · left ventricular wall segments contract normally.  · Left ventricular diastolic  dysfunction (grade I) consistent with   impaired relaxation.  · Normal chamber dimensions  · Normal valve morphology  · There is no evidence of pericardial effusion.               Labs    Results from last 7 days  Lab Units 12/16/17  0435   WBC 10*3/mm3 8.38   HEMOGLOBIN g/dL 13.1   HEMATOCRIT % 40.5   PLATELETS 10*3/mm3 208       Results from last 7 days  Lab Units 12/16/17  0435   SODIUM mmol/L 140   POTASSIUM mmol/L 4.1   CHLORIDE mmol/L 107   CO2 mmol/L 29.4   BUN mg/dL 14   CREATININE mg/dL 0.89   CALCIUM mg/dL 8.7   GLUCOSE mg/dL 224*       Results from last 7 days  Lab Units 12/16/17  1503 12/16/17  0435   BILIRUBIN mg/dL  --  0.1*   ALK PHOS U/L  --  75   AST (SGOT) U/L  --  15   ALT (SGPT) U/L 13 13           Results from last 7 days  Lab Units 12/16/17  0435   INR  0.77*       Results from last 7 days  Lab Units 12/16/17  0435   CRP mg/dL <0.50       Results from last 7 days  Lab Units 12/16/17  1231 12/16/17  0712 12/16/17  0435   TROPONIN I ng/mL 0.009 <0.006 <0.006       Results from last 7 days  Lab Units 12/16/17  0422   PH, ARTERIAL pH units 7.385   PO2 ART mm Hg 72.6*   PCO2, ARTERIAL mm Hg 42.9   HCO3 ART mmol/L 25.1         Imaging  Imaging Results (last 24 hours)     Procedure Component Value Units Date/Time    XR Chest 1 View [[id number]] Collected:  12/16/17 1025     Updated:  12/16/17 1027    Narrative:       XR CHEST 1 VW-     CLINICAL INDICATION: soa          COMPARISON: 10/28/2017      TECHNIQUE: Single frontal view of the chest.     FINDINGS:     There is no focal alveolar infiltrate or effusion.  The cardiac silhouette is normal. The pulmonary vasculature is  unremarkable.  There is no evidence of an acute osseous abnormality.   There are no suspicious-appearing parenchymal soft tissue nodules.            Impression:       No evidence of active or acute cardiopulmonary disease on today's chest  radiograph.         This report was finalized on 12/16/2017 10:25 AM by Dr. Wilfrid Purcell MD.                Assessment     1.  Chest pain.  With both typical and atypical features for angina.  Rule out MI.  2.  Multiple cardiac risk factors-DM type II, hypertension, hyperlipidemia and smoking  3.  Exacerbation of COPD/possible pneumonia      Plan    1.  Medications-continue aspirin, atorvastatin and metoprolol.  2.  Reviewed echocardiographic study-as above  3.  Scheduled for Lexiscan myocardial perfusion study for tomorrow if MI is ruled out.  4.  She was instructed to stop smoking.    I discussed the patients findings and my recommendations with patient     Thanks Dr. Truong for the consult     Oswaldo Sparks MD  12/16/17  3:35 PM

## 2017-12-16 NOTE — ED PROVIDER NOTES
Subjective   History of Present Illness    Review of Systems    Past Medical History:   Diagnosis Date   • Arthritis    • Asthma    • Chest pain    • COPD (chronic obstructive pulmonary disease)    • Depression    • Diabetes mellitus    • Emphysema of lung    • Emphysema of lung    • Injury of back    • Migraine    • Pneumonia    • Seizures    • Stroke        No Known Allergies    Past Surgical History:   Procedure Laterality Date   • KIDNEY SURGERY      cyst removal   • PORTACATH PLACEMENT      left subclavian   • SINUS SURGERY         History reviewed. No pertinent family history.    Social History     Social History   • Marital status: Single     Spouse name: N/A   • Number of children: N/A   • Years of education: N/A     Social History Main Topics   • Smoking status: Current Every Day Smoker     Packs/day: 0.50     Years: 25.00     Types: Cigarettes   • Smokeless tobacco: Never Used   • Alcohol use No   • Drug use: No   • Sexual activity: Defer     Other Topics Concern   • None     Social History Narrative   • None           Objective   Physical Exam    Procedures         ED Course  ED Course   Value Comment By Time   ECG 12 Lead EKG interpretation time 5:00 sinus tachycardia 106 bpm nonspecific ST changes  QTc is 441 Sachi Richdix, DO 12/16 6411    Pt presents to ED with COPD exacerbation- has been on prednisone and antibiotics but hasnt been feeling better- giving IV steroids, antibiotics, - repeat troponin pending - endorsed to Dr Strange at shift change. Sachi Julian Crow, DO 12/16 4737                Patient subsequently evaluated and reports that she had substernal chest pain along with shortness of breath this morning prior to arrival.  Patient EKG shows sinus tachycardia.  No ST elevation appreciated.  Patient denies recent cardiac workup in the last year.  Patient hemodynamically stable at this time.  Repeat examination shows an no wheezing appreciated.  Patient denies any chest pain.   She is HEART score of 4. Have contacted Dr. Truong who is agreeable to admit to hospital.   MDM    Final diagnoses:   Chest pain, unspecified type   Elevated blood pressure reading   Chronic obstructive pulmonary disease, unspecified COPD type            Booker Strange MD  12/16/17 4605

## 2017-12-16 NOTE — ED NOTES
Unsuccessful attempt to start IV x 2, RFA and LAC.  Pt tolerates well, site care per protocol.  Dr Strange made aware.     Rima Elam RN  12/16/17 2540

## 2017-12-16 NOTE — ED PROVIDER NOTES
Subjective   Patient is a 57 y.o. female presenting with shortness of breath.   History provided by:  Patient  Shortness of Breath   Severity:  Moderate  Onset quality:  Gradual  Timing:  Constant  Progression:  Worsening  Chronicity:  Recurrent  Context: activity and URI    Relieved by:  Nothing  Worsened by:  Activity and coughing  Ineffective treatments:  Oxygen (antibiotics and steroids)  Associated symptoms: abdominal pain, chest pain, cough, sputum production and wheezing    Associated symptoms: no headaches, no neck pain, no rash and no vomiting    Risk factors: obesity and tobacco use        Review of Systems   Constitutional: Negative for activity change, appetite change, chills and fatigue.   HENT: Negative for congestion.    Eyes: Negative for pain.   Respiratory: Positive for cough, sputum production, shortness of breath and wheezing. Negative for stridor.    Cardiovascular: Positive for chest pain.   Gastrointestinal: Positive for abdominal pain. Negative for diarrhea, nausea and vomiting.   Genitourinary: Negative for dysuria.   Musculoskeletal: Negative for arthralgias, myalgias, neck pain and neck stiffness.   Skin: Negative for rash.   Neurological: Negative for dizziness, syncope, speech difficulty, weakness and headaches.   Psychiatric/Behavioral: Negative for agitation.       Past Medical History:   Diagnosis Date   • Arthritis    • Asthma    • Chest pain    • COPD (chronic obstructive pulmonary disease)    • Depression    • Diabetes mellitus    • Emphysema of lung    • Emphysema of lung    • Injury of back    • Migraine    • Pneumonia    • Seizures    • Stroke        No Known Allergies    Past Surgical History:   Procedure Laterality Date   • KIDNEY SURGERY      cyst removal   • PORTACATH PLACEMENT      left subclavian   • SINUS SURGERY         History reviewed. No pertinent family history.    Social History     Social History   • Marital status: Single     Spouse name: N/A   • Number of  children: N/A   • Years of education: N/A     Social History Main Topics   • Smoking status: Current Every Day Smoker     Packs/day: 0.50     Years: 25.00     Types: Cigarettes   • Smokeless tobacco: Never Used   • Alcohol use No   • Drug use: No   • Sexual activity: Defer     Other Topics Concern   • None     Social History Narrative   • None           Objective   Physical Exam   Constitutional: She is oriented to person, place, and time. She appears well-developed and well-nourished.   HENT:   Head: Normocephalic and atraumatic.   Right Ear: External ear normal.   Left Ear: External ear normal.   Nose: Nose normal.   Mouth/Throat: Oropharynx is clear and moist.   Eyes: EOM are normal.   Neck: Neck supple.   Cardiovascular: Normal rate.    Pulmonary/Chest: Effort normal. She has wheezes.   Abdominal: Soft. There is tenderness.   Musculoskeletal: Normal range of motion.   Neurological: She is alert and oriented to person, place, and time.   Skin: Skin is warm.   Psychiatric: She has a normal mood and affect. Her behavior is normal. Judgment and thought content normal.   Nursing note and vitals reviewed.      Procedures         ED Course  ED Course   Value Comment By Time   ECG 12 Lead EKG interpretation time 5:00 sinus tachycardia 106 bpm nonspecific ST changes  QTc is 441 Sachi Maria, DO 12/16 0522    Pt presents to ED with COPD exacerbation- has been on prednisone and antibiotics but hasnt been feeling better- giving IV steroids, antibiotics, - repeat troponin pending - endorsed to Dr tSrange at shift change. Sachi Maria, DO 12/16 0713                  [facility]    Final diagnoses:   Chest pain, unspecified type   Elevated blood pressure reading   Chronic obstructive pulmonary disease, unspecified COPD type            Sachi Maria, DO  12/16/17 6676

## 2017-12-16 NOTE — H&P
[facility] HOSPITALIST HISTORY AND PHYSICAL    Patient Identification:  Name:  Marissa Lau  Age:  57 y.o.  Sex:  female  :  1960  MRN:  [id number]   Visit Number:  [id number]  Primary Care Physician:  No Known Provider Latoya Nurse Practitioner       Chief complaint: Shortness of breath     History of presenting illness: Patient is a 57 y.o. female with a past medical history of COPD, seizure disorder, h/o stroke with residual LLE weakness and aphasia, HLD, NIDDM Type II DM, Anxiety, who reports that over the course of the last week she has been feeling ill. She has been experiencing subjective fever with chills/rigors. She has experienced cough with production of gray/brown sputum, with no reports of hemoptysis. She become increasingly short of breath during this time, particularly with any sort of exertion. She only wears oxygen as needed during the day but does wear at night. She has episodic chest pain that can be provoked with activity or occur at rest. She describes this pain as pressure in sensation, with radiation into the neck and accompanying shortness of breath and sweating episodes. She tells me she had a stress done approximately 3 years ago (treadmill) and was unable to finish this because she got hypoxic and had to be placed in ICU. She believes she had an ECHO at that time, but results are unknown. No history of heart cath. She has occassional palpitations with epiodes of dizzyness. She got up last night to go to the bathroom and experienced worsening dyspnea and chest pressure, along with dizziness. She thought she was going to pass out but fortunately was able to gather herself and sit down, which alleviated her symptoms. She gets these episodes from time to time and has not been evaluated for them. She uses a cane for assistance with her residual weakness from her CVA. Her last seizure was less than 6 months ago. She has never seen a neurologist but has been taking  dilantin for seizure control.    She is feeling better now than when she initially came in today. She is able to carry out a conversation without much shortness of breath. She has continued to ask for pain medication since her arrival to the telemetry floor. However, when I first entered her room to see her, she was sleeping soundly in no distress. Furthermore, she was groggy for a while after waking up. Her nurse stated she had recently received her home klonopin and gabapentin. After she was more alert, she resumed her requests for pain medication. She claims she hurts everywhere, but primarily her arms which she attributes to multiple attempts to establish IV access which were not successful.  ---------------------------------------------------------------------------------------------------------------------   Review of Systems   Constitutional: Positive for appetite change, chills, diaphoresis and fever.   HENT: Positive for congestion. Negative for postnasal drip, rhinorrhea and sinus pressure.    Eyes: Negative for photophobia, pain, discharge, redness, itching and visual disturbance.   Respiratory: Positive for cough, chest tightness, shortness of breath and wheezing.    Cardiovascular: Positive for chest pain and palpitations. Negative for leg swelling.   Gastrointestinal: Positive for nausea. Negative for abdominal distention, abdominal pain, constipation, diarrhea and vomiting.   Endocrine: Positive for polyuria. Negative for cold intolerance, heat intolerance, polydipsia and polyphagia.   Genitourinary: Positive for dysuria and frequency. Negative for decreased urine volume, difficulty urinating and flank pain.   Musculoskeletal: Positive for gait problem and myalgias.   Allergic/Immunologic: Negative for environmental allergies, food allergies and immunocompromised state.   Neurological: Positive for dizziness, seizures, weakness and light-headedness. Negative for tremors, syncope, numbness and  headaches.   Hematological: Negative for adenopathy. Does not bruise/bleed easily.   Psychiatric/Behavioral: Positive for sleep disturbance. Negative for agitation, behavioral problems and confusion. The patient is nervous/anxious.       ---------------------------------------------------------------------------------------------------------------------   Past Medical History:   Diagnosis Date   • Arthritis    • Asthma    • Chest pain    • COPD (chronic obstructive pulmonary disease)    • Depression    • Diabetes mellitus    • Emphysema of lung    • Emphysema of lung    • Injury of back    • Migraine    • Pneumonia    • Seizures    • Stroke      Past Surgical History:   Procedure Laterality Date   • KIDNEY SURGERY      cyst removal   • PORTACATH PLACEMENT      left subclavian   • SINUS SURGERY       Family History   Problem Relation Age of Onset   • COPD Mother    • Diabetes Mother    • Diabetes Father      Social History     Social History   • Marital status: Single     Spouse name: N/A   • Number of children: N/A   • Years of education: N/A     Social History Main Topics   • Smoking status: Current Every Day Smoker     Packs/day: 0.50     Years: 25.00     Types: Cigarettes   • Smokeless tobacco: Never Used   • Alcohol use No   • Drug use: No   • Sexual activity: Defer     Other Topics Concern   • None     Social History Narrative   • None     ---------------------------------------------------------------------------------------------------------------------   Allergies:  Review of patient's allergies indicates no known allergies.  ---------------------------------------------------------------------------------------------------------------------   Prior to Admission Medications     Prescriptions Last Dose Informant Patient Reported? Taking?    aspirin 81 MG EC tablet   Yes Yes    Take 81 mg by mouth Daily.    nitroglycerin (NITROSTAT) 0.4 MG SL tablet   Yes Yes    Place 0.4 mg under the tongue Every 5 (Five)  Minutes As Needed for Chest Pain. Take no more than 3 doses in 15 minutes.    albuterol (ACCUNEB) 1.25 MG/3ML nebulizer solution   Yes No    Take 1 ampule by nebulization Every 6 (Six) Hours As Needed for Wheezing.    albuterol (PROVENTIL HFA;VENTOLIN HFA) 108 (90 BASE) MCG/ACT inhaler   Yes No    Inhale 2 puffs Every 4 (Four) Hours As Needed for Wheezing.    budesonide-formoterol (SYMBICORT) 160-4.5 MCG/ACT inhaler   Yes No    Inhale 2 puffs 2 (Two) Times a Day.    cefdinir (OMNICEF) 300 MG capsule   Yes No    Take 300 mg by mouth 2 (Two) Times a Day.    clonazePAM (KlonoPIN) 0.5 MG tablet   Yes No    Take 0.5 mg by mouth 2 (Two) Times a Day As Needed.    dicyclomine (BENTYL) 20 MG tablet   Yes No    Take 20 mg by mouth Every 6 (Six) Hours.    fluticasone-salmeterol (ADVAIR) 500-50 MCG/DOSE DISKUS   Yes No    Inhale 1 puff 2 (Two) Times a Day.    gabapentin (NEURONTIN) 800 MG tablet   Yes No    Take 300 mg by mouth 3 (Three) Times a Day.    guaifenesin-codeine (GUAIFENESIN AC) 100-10 MG/5ML liquid   No No    Take 10 mL by mouth 4 (Four) Times a Day As Needed for Cough or Congestion.    ipratropium-albuterol (COMBIVENT RESPIMAT)  MCG/ACT inhaler   Yes No    Inhale 1 puff 4 (Four) Times a Day As Needed for Wheezing.    ipratropium-albuterol (DUO-NEB) 0.5-2.5 mg/mL nebulizer   No No    Take 3 mL by nebulization Every 4 (Four) Hours As Needed for Wheezing or Shortness of Air.    metFORMIN (GLUCOPHAGE) 500 MG tablet   Yes No    Take 500 mg by mouth Daily With Breakfast.    metoclopramide (REGLAN) 10 MG tablet   Yes No    Take 10 mg by mouth 4 (Four) Times a Day Before Meals & at Bedtime.    naproxen (NAPROSYN) 500 MG tablet   No No    Take 1 tablet by mouth 2 (Two) Times a Day As Needed for Mild Pain .    phenytoin (DILANTIN) 100 MG ER capsule   Yes No    Take 300 mg by mouth every night at bedtime.    rOPINIRole (REQUIP) 1 MG tablet   Yes No    Take 1 mg by mouth Every Night. Take 1 hour before bedtime.     zolpidem (AMBIEN) 10 MG tablet   Yes No    Take 5 mg by mouth At Night As Needed for Sleep.        Hospital Scheduled Meds:    [START ON 12/17/2017] aspirin 81 mg Oral Daily   atorvastatin 10 mg Oral Daily   doxycycline 100 mg Oral Q12H   enoxaparin 40 mg Subcutaneous Q24H   gabapentin 300 mg Oral TID   insulin aspart 0-7 Units Subcutaneous 4x Daily AC & at Bedtime   ipratropium-albuterol 3 mL Nebulization Q6H - RT   lisinopril 10 mg Oral Daily   metoprolol tartrate 25 mg Oral Q12H   nicotine 1 patch Transdermal Once   [START ON 12/17/2017] pantoprazole 40 mg Oral QAM   phenytoin 300 mg Oral Daily   [START ON 12/17/2017] predniSONE 40 mg Oral Daily With Breakfast   Risaquad-2 1 capsule Oral Daily   rOPINIRole 1 mg Oral Nightly        ---------------------------------------------------------------------------------------------------------------------   Vital Signs:  Temp:  [97.7 °F (36.5 °C)-98.3 °F (36.8 °C)] 98.3 °F (36.8 °C)  Heart Rate:  [] 97  Resp:  [17-28] 18  BP: (106-144)/() 132/83  Last 3 weights    12/16/17  0358 12/16/17  1221   Weight: 79.8 kg (176 lb) 79.6 kg (175 lb 8 oz)     Body mass index is 182.75 kg/(m^2).  ---------------------------------------------------------------------------------------------------------------------   Physical Exam:  Constitutional:  Well-developed and well-nourished.  No respiratory distress. Sleeping soundly initially. Fairly easy to awaken. No acute distress.     HENT:  Head: Normocephalic and atraumatic.  Mouth:  Moist mucous membranes.    Eyes:  Conjunctivae and EOM are normal.  Pupils are equal, round, and reactive to light.  No scleral icterus.  Neck:  Neck supple.  No JVD present.    Cardiovascular:  Normal rate, regular rhythm and normal heart sounds with no murmur.  Pulmonary/Chest:  No respiratory distress. Overall poor air movement with scattered expiratory wheezes. No rhonchi or rales.  Abdominal:  Soft.  Bowel sounds are normal.  No distension and  no tenderness.   Musculoskeletal:  No edema, no tenderness, and no deformity.    Neurological:  Alert and oriented to person, place, and time. Groggy after awakened initially, but this resolved after a minute or so. Moving all 4 extremities spontaneously, no gross deficits appreciated on observation but did not test strength directly.  Skin:  Skin is warm and dry.  No rash noted.  No pallor. Port-a-cath left upper chest wall. Attempted to access 4 times with no success.  Psychiatric:  Normal mood and affect.  Behavior is normal.  Judgment and thought content normal.   Peripheral vascular:  No edema and strong pulses on all 4 extremities.    ---------------------------------------------------------------------------------------------------------------------  EKG:    Sinus tachycardia, . QTc 441. Possible left atrial enlargement. No overt ST changes to suggest acute ischemia.   ---------------------------------------------------------------------------------------------------------------------     Results from last 7 days  Lab Units 12/16/17  0944 12/16/17  0435   CRP mg/dL  --  <0.50   LACTATE mmol/L 2.3* 3.4*   WBC 10*3/mm3  --  8.38   HEMOGLOBIN g/dL  --  13.1   HEMATOCRIT %  --  40.5   MCV fL  --  96.4*   MCHC g/dL  --  32.3*   PLATELETS 10*3/mm3  --  208   INR   --  0.77*       Results from last 7 days  Lab Units 12/16/17  0422   PH, ARTERIAL pH units 7.385   PO2 ART mm Hg 72.6*   PCO2, ARTERIAL mm Hg 42.9   HCO3 ART mmol/L 25.1       Results from last 7 days  Lab Units 12/16/17  1503 12/16/17  0435   SODIUM mmol/L  --  140   POTASSIUM mmol/L  --  4.1   CHLORIDE mmol/L  --  107   CO2 mmol/L  --  29.4   BUN mg/dL  --  14   CREATININE mg/dL  --  0.89   EGFR IF NONAFRICN AM mL/min/1.73  --  66   CALCIUM mg/dL  --  8.7   GLUCOSE mg/dL  --  224*   ALBUMIN g/dL  --  3.80   BILIRUBIN mg/dL  --  0.1*   ALK PHOS U/L  --  75   AST (SGOT) U/L  --  15   ALT (SGPT) U/L 13 13   CrCl cannot be calculated (Unknown ideal  weight.).  No results found for: AMMONIA    Results from last 7 days  Lab Units 12/16/17  1845 12/16/17  1231 12/16/17  0712   TROPONIN I ng/mL <0.006 0.009 <0.006         Lab Results   Component Value Date    HGBA1C 6.10 (H) 12/16/2017     Lab Results   Component Value Date    TSH 0.083 (L) 12/16/2017     Lab Results   Component Value Date    PREGTESTUR Negative 12/16/2017     Pain Management Panel     There is no flowsheet data to display.                        ---------------------------------------------------------------------------------------------------------------------  Imaging Results (last 7 days)     Procedure Component Value Units Date/Time    XR Chest 1 View [[id number]] Updated:  12/16/17 0448      No acute findings on chest XR.    I have personally reviewed the radiology images and read the final radiology report.  ---------------------------------------------------------------------------------------------------------------------  Assessment and Plan:  - COPD exacerbation: Continue doxycycline but change to PO since no IV access. D/c rocephin since CRP is undectable and no evidence of pneumonia on chest XR. Continue nebs and oxygen/steroids as needed (change IV solu-medrol to PO prednisone). Will obtain a sputum culture. Will evaluate for Legionella and strep pneumonia urine antigens. Influenza A&B negative. Blood cultures are pending.  Reflex lactic acid coming down, likely elevated from increased albuterol use at home leading up to admission. Reports being hosptialized for pneumonia several times. Will obtain old records from [facility]. Strongly encouraged to stop smooking.    - Chest pain in setting of multiple cardiac risk factors: negative cardiac troponins x2. EKG demonstrates Sinus Tachycardia with a rate of 106 no acute ischemia. Will further evaluate with 2D echo and continue telemetry monitoring. Cardiology has been consulted. Dr Sparks now following. On ASA, metoprolol, statin. If  rules out for MI, will proceed with stress test tomorrow.    - Episodes of palpitations, sinus tachycardia, with feelings of dizzyness/near syncope- will continue to monitor patient on telemetry and monitor heart rate and rhythm. Cardiology following as mentioned above. Now on metoprolol. TSH is low, free T4 and T3 are pending.     - Type II DM (NIDDM): will hold home PO diabetic medications and place the patient on Low dose SSI with hypoglycemic protocol. Monitor accuchecks closely. Cardiac/consistent carb diet until midnight. A1c of 6.10% indicates optimal control.     - History of CVA with residual LLE weakness and mild aphasia. Continue home medications, fall precautions, up with assistance only.    - Seizure disorder- place on seizure precautions. Dilantin level low. Continue home dose for now.    - Dyslipidemia-will continue patient on statin and monitor LFT's. Will check fasting liver and lipid profile.    - Anxiety-stable at this time. Continue home klonopin with hold parameters.    - Dysuria-UA is normal. Discontinuing rocephin since UA is normal, CRP is undetectable and chest XR is clear.      - DVT prophylaxis: Subcu Lovenox.     Anticipated length of stay <2 midnights    Plan of care discussed with patient and her RN Uzma on [facility].    * I have seen the patient in conjunction with CARLEY Reilly, and have amended his note to reflect my own findings, assessment and plan.    Aaron Bejarano MD  12/16/17  8:06 PM

## 2017-12-17 ENCOUNTER — APPOINTMENT (OUTPATIENT)
Dept: NUCLEAR MEDICINE | Facility: HOSPITAL | Age: 57
End: 2017-12-17
Attending: INTERNAL MEDICINE

## 2017-12-17 VITALS
DIASTOLIC BLOOD PRESSURE: 81 MMHG | TEMPERATURE: 97.8 F | HEIGHT: 55 IN | WEIGHT: 177.2 LBS | RESPIRATION RATE: 20 BRPM | OXYGEN SATURATION: 97 % | SYSTOLIC BLOOD PRESSURE: 111 MMHG | HEART RATE: 82 BPM | BODY MASS INDEX: 41.01 KG/M2

## 2017-12-17 LAB — GLUCOSE BLDC GLUCOMTR-MCNC: 91 MG/DL (ref 70–130)

## 2017-12-17 PROCEDURE — G0378 HOSPITAL OBSERVATION PER HR: HCPCS

## 2017-12-17 PROCEDURE — 63710000001 PREDNISONE PER 1 MG: Performed by: HOSPITALIST

## 2017-12-17 PROCEDURE — 87205 SMEAR GRAM STAIN: CPT | Performed by: PHYSICIAN ASSISTANT

## 2017-12-17 PROCEDURE — 82962 GLUCOSE BLOOD TEST: CPT

## 2017-12-17 PROCEDURE — 87070 CULTURE OTHR SPECIMN AEROBIC: CPT | Performed by: PHYSICIAN ASSISTANT

## 2017-12-17 PROCEDURE — 94799 UNLISTED PULMONARY SVC/PX: CPT

## 2017-12-17 RX ADMIN — ACETAMINOPHEN 650 MG: 325 TABLET ORAL at 05:39

## 2017-12-17 RX ADMIN — Medication 1 CAPSULE: at 08:11

## 2017-12-17 RX ADMIN — IPRATROPIUM BROMIDE AND ALBUTEROL SULFATE 3 ML: .5; 3 SOLUTION RESPIRATORY (INHALATION) at 06:19

## 2017-12-17 RX ADMIN — GABAPENTIN 300 MG: 300 CAPSULE ORAL at 08:11

## 2017-12-17 RX ADMIN — CLONAZEPAM 0.5 MG: 0.5 TABLET ORAL at 05:39

## 2017-12-17 RX ADMIN — ASPIRIN 81 MG: 81 TABLET ORAL at 08:11

## 2017-12-17 RX ADMIN — PHENYTOIN SODIUM 300 MG: 100 CAPSULE, EXTENDED RELEASE ORAL at 08:11

## 2017-12-17 RX ADMIN — METOPROLOL TARTRATE 25 MG: 25 TABLET, FILM COATED ORAL at 08:11

## 2017-12-17 RX ADMIN — PREDNISONE 40 MG: 20 TABLET ORAL at 08:11

## 2017-12-17 RX ADMIN — LISINOPRIL 10 MG: 10 TABLET ORAL at 08:11

## 2017-12-17 RX ADMIN — DOXYCYCLINE 100 MG: 100 CAPSULE ORAL at 08:11

## 2017-12-17 RX ADMIN — ATORVASTATIN CALCIUM 10 MG: 10 TABLET, FILM COATED ORAL at 08:11

## 2017-12-17 NOTE — PLAN OF CARE
Problem: Acute Coronary Syndrome (ACS) (Adult)  Goal: Signs and Symptoms of Listed Potential Problems Will be Absent or Manageable (Acute Coronary Syndrome)  Outcome: Ongoing (interventions implemented as appropriate)    Problem: Cardiac Output, Decreased (Adult)  Goal: Identify Related Risk Factors and Signs and Symptoms  Outcome: Ongoing (interventions implemented as appropriate)  Goal: Adequate Cardiac Output/Effective Tissue Perfusion  Outcome: Ongoing (interventions implemented as appropriate)    Problem: Patient Care Overview (Adult)  Goal: Plan of Care Review  Outcome: Ongoing (interventions implemented as appropriate)  Goal: Adult Individualization and Mutuality  Outcome: Ongoing (interventions implemented as appropriate)  Goal: Discharge Needs Assessment  Outcome: Ongoing (interventions implemented as appropriate)

## 2017-12-17 NOTE — PLAN OF CARE
Problem: Diabetes, Type 2 (Adult)  Goal: Signs and Symptoms of Listed Potential Problems Will be Absent or Manageable (Diabetes, Type 2)  Outcome: Ongoing (interventions implemented as appropriate)    Problem: Pain, Acute (Adult)  Goal: Identify Related Risk Factors and Signs and Symptoms  Outcome: Ongoing (interventions implemented as appropriate)  Goal: Acceptable Pain Control/Comfort Level  Outcome: Ongoing (interventions implemented as appropriate)    Problem: Acute Coronary Syndrome (ACS) (Adult)  Goal: Signs and Symptoms of Listed Potential Problems Will be Absent or Manageable (Acute Coronary Syndrome)  Outcome: Ongoing (interventions implemented as appropriate)    Problem: Cardiac Output, Decreased (Adult)  Goal: Identify Related Risk Factors and Signs and Symptoms  Outcome: Ongoing (interventions implemented as appropriate)  Goal: Adequate Cardiac Output/Effective Tissue Perfusion  Outcome: Ongoing (interventions implemented as appropriate)    Problem: Patient Care Overview (Adult)  Goal: Plan of Care Review  Outcome: Ongoing (interventions implemented as appropriate)  Goal: Adult Individualization and Mutuality  Outcome: Ongoing (interventions implemented as appropriate)  Goal: Discharge Needs Assessment  Outcome: Ongoing (interventions implemented as appropriate)

## 2017-12-17 NOTE — DISCHARGE SUMMARY
Date of Admission: 12/16/2017    Date of Discharge:  12/17/2017    PCP: No Known Provider    Admission Diagnosis:   Please see admission H&P    Discharge Diagnosis:   Chest pain  Acute exacerbation of COPD  Episodes of palpitations   DM II, non-insulin dependent  Hx of CVA with residual LLE weakness and mild aphasia  Seizure disorder  Dyslipidemia  Anxiety  Dysuria     Procedures Performed:  None     Consults:   Consults     Date and Time Order Name Status Description    12/16/2017 1221 Inpatient Consult to Cardiology      12/16/2017 0840 IP Consult to Hospitalist              History of Present Illness:  Marissa Lau is a 57 y.o. female who presented to [facility] ED with CC of shortness of breath and chest pain. Please see admission H&P for complete details.     In the ED, she was hemodynamically stable. EKG revealed sinus tachycardia with no acute ischemic changes. Initial troponin was negative. CXR did not reveal any evidence of active cardiopulmonary disease.        Hospital Course  Marissa Lau was admitted to the telemetry foor for further evaluation and treatment. She was continued on an ASA, statin and beta blocker. She was also started on antibiotics and systemic steroids for a COPD exacerbation. Cardiology was consulted for further input in the setting of her cardiac risk factors.     Serial CE's were followed and remained negative. She was evaluated by cardiology who recommended to proceed with a nuclear stress test as she had ruled out for an acute MI. Of note, she lost IV access and her steroids and antibiotics were converted to PO. The PICC RN was asked to place an IV as she needed a stress test. However, when she was taken down for the stress test she stated she felt improved and did not want to proceed with the stress test per reports.     I was notified on the morning of 12/17 that she wished to leave AMA. Before I could speak to her personally to explain the risks of leaving AMA, she signed out and  left. Please refer to Dr. Bejarano' H&P and the cardiology consult note as I never established an encounter with the patient during this hospitalization.       Condition on Discharge:  Guarded as pt left AMA.     Vital Signs  Vitals:    12/17/17 0640   BP: 111/81   Pulse: 82   Resp: 20   Temp: 97.8 °F (36.6 °C)   SpO2: 97%       Physical Exam: Pt left AMA prior to my encounter.                         Discharge Disposition: Pt left AMA.             Follow-up Appointments:         Delmer Hannon DO  12/17/17  11:05 AM

## 2017-12-19 LAB
BACTERIA FLD CULT: NORMAL
BACTERIA SPEC RESP CULT: NORMAL
GRAM STN SPEC: NORMAL
GRAM STN SPEC: NORMAL
Lab: NORMAL
ORGANISM ID: NORMAL
S PNEUM AG SPEC QL LA: NEGATIVE
SPECIMEN SOURCE: NORMAL

## 2017-12-20 ENCOUNTER — APPOINTMENT (OUTPATIENT)
Dept: GENERAL RADIOLOGY | Facility: HOSPITAL | Age: 57
End: 2017-12-20

## 2017-12-20 ENCOUNTER — HOSPITAL ENCOUNTER (EMERGENCY)
Facility: HOSPITAL | Age: 57
Discharge: HOME | End: 2017-12-21
Attending: FAMILY MEDICINE | Admitting: FAMILY MEDICINE

## 2017-12-20 DIAGNOSIS — J44.1 COPD EXACERBATION (HCC): Primary | ICD-10-CM

## 2017-12-20 LAB
A-A DO2: 55.4 MMHG (ref 0–300)
ALBUMIN SERPL-MCNC: 3.6 G/DL (ref 3.5–5)
ALBUMIN/GLOB SERPL: 1.5 G/DL (ref 1.5–2.5)
ALP SERPL-CCNC: 81 U/L (ref 35–104)
ALT SERPL W P-5'-P-CCNC: 27 U/L (ref 10–36)
ANION GAP SERPL CALCULATED.3IONS-SCNC: 9.6 MMOL/L (ref 3.6–11.2)
ARTERIAL PATENCY WRIST A: ABNORMAL
AST SERPL-CCNC: 20 U/L (ref 10–30)
ATMOSPHERIC PRESS: 719 MMHG
BASE EXCESS BLDA CALC-SCNC: 3.5 MMOL/L
BASOPHILS # BLD AUTO: 0.03 10*3/MM3 (ref 0–0.3)
BASOPHILS NFR BLD AUTO: 0.2 % (ref 0–2)
BDY SITE: ABNORMAL
BILIRUB SERPL-MCNC: 0.2 MG/DL (ref 0.2–1.8)
BILIRUB UR QL STRIP: NEGATIVE
BNP SERPL-MCNC: 50 PG/ML (ref 0–100)
BODY TEMPERATURE: 98.6 C
BUN BLD-MCNC: 18 MG/DL (ref 7–21)
BUN/CREAT SERPL: 15 (ref 7–25)
CALCIUM SPEC-SCNC: 7.9 MG/DL (ref 7.7–10)
CHLORIDE SERPL-SCNC: 106 MMOL/L (ref 99–112)
CLARITY UR: CLEAR
CO2 SERPL-SCNC: 28.4 MMOL/L (ref 24.3–31.9)
COHGB MFR BLD: 7.6 % (ref 0–5)
COLOR UR: YELLOW
CREAT BLD-MCNC: 1.2 MG/DL (ref 0.43–1.29)
D-LACTATE SERPL-SCNC: 2.6 MMOL/L (ref 0.5–2)
DEPRECATED RDW RBC AUTO: 52.3 FL (ref 37–54)
EOSINOPHIL # BLD AUTO: 0.11 10*3/MM3 (ref 0–0.7)
EOSINOPHIL NFR BLD AUTO: 0.8 % (ref 0–5)
ERYTHROCYTE [DISTWIDTH] IN BLOOD BY AUTOMATED COUNT: 15 % (ref 11.5–14.5)
FLUAV AG NPH QL: NEGATIVE
FLUBV AG NPH QL IA: NEGATIVE
GFR SERPL CREATININE-BSD FRML MDRD: 47 ML/MIN/1.73
GLOBULIN UR ELPH-MCNC: 2.4 GM/DL
GLUCOSE BLD-MCNC: 132 MG/DL (ref 70–110)
GLUCOSE UR STRIP-MCNC: NEGATIVE MG/DL
HCO3 BLDA-SCNC: 27 MMOL/L (ref 22–26)
HCT VFR BLD AUTO: 42.4 % (ref 37–47)
HCT VFR BLD CALC: 33 % (ref 37–47)
HGB BLD-MCNC: 13.8 G/DL (ref 12–16)
HGB BLDA-MCNC: 11.1 G/DL (ref 12–16)
HGB UR QL STRIP.AUTO: NEGATIVE
HOLD SPECIMEN: NORMAL
HOLD SPECIMEN: NORMAL
HOROWITZ INDEX BLD+IHG-RTO: 28 %
IMM GRANULOCYTES # BLD: 0.1 10*3/MM3 (ref 0–0.03)
IMM GRANULOCYTES NFR BLD: 0.7 % (ref 0–0.5)
KETONES UR QL STRIP: ABNORMAL
LEUKOCYTE ESTERASE UR QL STRIP.AUTO: NEGATIVE
LYMPHOCYTES # BLD AUTO: 1.96 10*3/MM3 (ref 1–3)
LYMPHOCYTES NFR BLD AUTO: 14 % (ref 21–51)
MCH RBC QN AUTO: 31.2 PG (ref 27–33)
MCHC RBC AUTO-ENTMCNC: 32.5 G/DL (ref 33–37)
MCV RBC AUTO: 95.9 FL (ref 80–94)
METHGB BLD QL: 0.3 % (ref 0–3)
MODALITY: ABNORMAL
MONOCYTES # BLD AUTO: 0.72 10*3/MM3 (ref 0.1–0.9)
MONOCYTES NFR BLD AUTO: 5.1 % (ref 0–10)
NEUTROPHILS # BLD AUTO: 11.11 10*3/MM3 (ref 1.4–6.5)
NEUTROPHILS NFR BLD AUTO: 79.2 % (ref 30–70)
NITRITE UR QL STRIP: NEGATIVE
NRBC BLD MANUAL-RTO: 0 /100 WBC (ref 0–0)
OSMOLALITY SERPL CALC.SUM OF ELEC: 290.6 MOSM/KG (ref 273–305)
OXYHGB MFR BLDV: 89.3 % (ref 85–100)
PCO2 BLDA: 37 MM HG (ref 35–45)
PH BLDA: 7.48 PH UNITS (ref 7.35–7.45)
PH UR STRIP.AUTO: 6 [PH] (ref 5–8)
PLATELET # BLD AUTO: 258 10*3/MM3 (ref 130–400)
PMV BLD AUTO: 10.1 FL (ref 6–10)
PO2 BLDA: 89.1 MM HG (ref 80–100)
POTASSIUM BLD-SCNC: 4.2 MMOL/L (ref 3.5–5.3)
PROT SERPL-MCNC: 6 G/DL (ref 6–8)
PROT UR QL STRIP: NEGATIVE
RBC # BLD AUTO: 4.42 10*6/MM3 (ref 4.2–5.4)
SAO2 % BLDCOA: 97 % (ref 90–100)
SODIUM BLD-SCNC: 144 MMOL/L (ref 135–153)
SP GR UR STRIP: 1.02 (ref 1–1.03)
TROPONIN I SERPL-MCNC: 0.01 NG/ML
UROBILINOGEN UR QL STRIP: ABNORMAL
WBC NRBC COR # BLD: 14.03 10*3/MM3 (ref 4.5–12.5)
WHOLE BLOOD HOLD SPECIMEN: NORMAL
WHOLE BLOOD HOLD SPECIMEN: NORMAL

## 2017-12-20 PROCEDURE — 94640 AIRWAY INHALATION TREATMENT: CPT

## 2017-12-20 PROCEDURE — 96361 HYDRATE IV INFUSION ADD-ON: CPT

## 2017-12-20 PROCEDURE — 82375 ASSAY CARBOXYHB QUANT: CPT | Performed by: PHYSICIAN ASSISTANT

## 2017-12-20 PROCEDURE — 71010 XR CHEST 1 VW: CPT | Performed by: RADIOLOGY

## 2017-12-20 PROCEDURE — 36600 WITHDRAWAL OF ARTERIAL BLOOD: CPT | Performed by: PHYSICIAN ASSISTANT

## 2017-12-20 PROCEDURE — 25010000002 METHYLPREDNISOLONE PER 125 MG: Performed by: PHYSICIAN ASSISTANT

## 2017-12-20 PROCEDURE — 94799 UNLISTED PULMONARY SVC/PX: CPT

## 2017-12-20 PROCEDURE — 87804 INFLUENZA ASSAY W/OPTIC: CPT | Performed by: PHYSICIAN ASSISTANT

## 2017-12-20 PROCEDURE — 25010000002 MORPHINE PER 10 MG: Performed by: FAMILY MEDICINE

## 2017-12-20 PROCEDURE — 25010000002 CEFTRIAXONE: Performed by: PHYSICIAN ASSISTANT

## 2017-12-20 PROCEDURE — 84484 ASSAY OF TROPONIN QUANT: CPT | Performed by: PHYSICIAN ASSISTANT

## 2017-12-20 PROCEDURE — 85025 COMPLETE CBC W/AUTO DIFF WBC: CPT | Performed by: FAMILY MEDICINE

## 2017-12-20 PROCEDURE — 87186 SC STD MICRODIL/AGAR DIL: CPT | Performed by: FAMILY MEDICINE

## 2017-12-20 PROCEDURE — 83050 HGB METHEMOGLOBIN QUAN: CPT | Performed by: PHYSICIAN ASSISTANT

## 2017-12-20 PROCEDURE — 83605 ASSAY OF LACTIC ACID: CPT | Performed by: FAMILY MEDICINE

## 2017-12-20 PROCEDURE — 93010 ELECTROCARDIOGRAM REPORT: CPT | Performed by: INTERNAL MEDICINE

## 2017-12-20 PROCEDURE — 71010 HC CHEST PA OR AP: CPT

## 2017-12-20 PROCEDURE — 93005 ELECTROCARDIOGRAM TRACING: CPT | Performed by: EMERGENCY MEDICINE

## 2017-12-20 PROCEDURE — 81003 URINALYSIS AUTO W/O SCOPE: CPT | Performed by: FAMILY MEDICINE

## 2017-12-20 PROCEDURE — 80053 COMPREHEN METABOLIC PANEL: CPT | Performed by: FAMILY MEDICINE

## 2017-12-20 PROCEDURE — 82805 BLOOD GASES W/O2 SATURATION: CPT | Performed by: PHYSICIAN ASSISTANT

## 2017-12-20 PROCEDURE — 96365 THER/PROPH/DIAG IV INF INIT: CPT

## 2017-12-20 PROCEDURE — 83880 ASSAY OF NATRIURETIC PEPTIDE: CPT | Performed by: PHYSICIAN ASSISTANT

## 2017-12-20 PROCEDURE — 87077 CULTURE AEROBIC IDENTIFY: CPT | Performed by: FAMILY MEDICINE

## 2017-12-20 PROCEDURE — 99285 EMERGENCY DEPT VISIT HI MDM: CPT

## 2017-12-20 PROCEDURE — 87147 CULTURE TYPE IMMUNOLOGIC: CPT | Performed by: FAMILY MEDICINE

## 2017-12-20 PROCEDURE — 87040 BLOOD CULTURE FOR BACTERIA: CPT | Performed by: FAMILY MEDICINE

## 2017-12-20 PROCEDURE — 96375 TX/PRO/DX INJ NEW DRUG ADDON: CPT

## 2017-12-20 PROCEDURE — 36415 COLL VENOUS BLD VENIPUNCTURE: CPT

## 2017-12-20 RX ORDER — IPRATROPIUM BROMIDE AND ALBUTEROL SULFATE 2.5; .5 MG/3ML; MG/3ML
3 SOLUTION RESPIRATORY (INHALATION) ONCE
Status: COMPLETED | OUTPATIENT
Start: 2017-12-20 | End: 2017-12-20

## 2017-12-20 RX ORDER — MORPHINE SULFATE 2 MG/ML
2 INJECTION, SOLUTION INTRAMUSCULAR; INTRAVENOUS ONCE
Status: COMPLETED | OUTPATIENT
Start: 2017-12-20 | End: 2017-12-20

## 2017-12-20 RX ORDER — IPRATROPIUM BROMIDE AND ALBUTEROL SULFATE 2.5; .5 MG/3ML; MG/3ML
3 SOLUTION RESPIRATORY (INHALATION) ONCE
Status: COMPLETED | OUTPATIENT
Start: 2017-12-20 | End: 2017-12-21

## 2017-12-20 RX ORDER — ASPIRIN 81 MG/1
324 TABLET, CHEWABLE ORAL ONCE
Status: COMPLETED | OUTPATIENT
Start: 2017-12-20 | End: 2017-12-20

## 2017-12-20 RX ORDER — DOXYCYCLINE 100 MG/1
100 CAPSULE ORAL ONCE
Status: COMPLETED | OUTPATIENT
Start: 2017-12-20 | End: 2017-12-20

## 2017-12-20 RX ORDER — SODIUM CHLORIDE 0.9 % (FLUSH) 0.9 %
10 SYRINGE (ML) INJECTION AS NEEDED
Status: DISCONTINUED | OUTPATIENT
Start: 2017-12-20 | End: 2017-12-21 | Stop reason: HOSPADM

## 2017-12-20 RX ORDER — METHYLPREDNISOLONE SODIUM SUCCINATE 125 MG/2ML
125 INJECTION, POWDER, LYOPHILIZED, FOR SOLUTION INTRAMUSCULAR; INTRAVENOUS ONCE
Status: COMPLETED | OUTPATIENT
Start: 2017-12-20 | End: 2017-12-20

## 2017-12-20 RX ADMIN — SODIUM CHLORIDE 1000 ML: 9 INJECTION, SOLUTION INTRAVENOUS at 20:54

## 2017-12-20 RX ADMIN — CEFTRIAXONE 1 G: 1 INJECTION, POWDER, FOR SOLUTION INTRAMUSCULAR; INTRAVENOUS at 23:33

## 2017-12-20 RX ADMIN — DOXYCYCLINE 100 MG: 100 CAPSULE ORAL at 23:33

## 2017-12-20 RX ADMIN — MORPHINE SULFATE 2 MG: 2 INJECTION, SOLUTION INTRAMUSCULAR; INTRAVENOUS at 22:28

## 2017-12-20 RX ADMIN — SODIUM CHLORIDE 2394 ML: 9 INJECTION, SOLUTION INTRAVENOUS at 22:00

## 2017-12-20 RX ADMIN — METHYLPREDNISOLONE SODIUM SUCCINATE 125 MG: 125 INJECTION, POWDER, FOR SOLUTION INTRAMUSCULAR; INTRAVENOUS at 20:54

## 2017-12-20 RX ADMIN — SODIUM CHLORIDE 2394 ML: 9 INJECTION, SOLUTION INTRAVENOUS at 22:47

## 2017-12-20 RX ADMIN — ASPIRIN 324 MG: 81 TABLET, CHEWABLE ORAL at 21:23

## 2017-12-20 RX ADMIN — IPRATROPIUM BROMIDE AND ALBUTEROL SULFATE 3 ML: .5; 3 SOLUTION RESPIRATORY (INHALATION) at 20:29

## 2017-12-21 VITALS
BODY MASS INDEX: 29.32 KG/M2 | TEMPERATURE: 98.1 F | RESPIRATION RATE: 18 BRPM | WEIGHT: 176 LBS | HEIGHT: 65 IN | SYSTOLIC BLOOD PRESSURE: 136 MMHG | HEART RATE: 94 BPM | OXYGEN SATURATION: 98 % | DIASTOLIC BLOOD PRESSURE: 90 MMHG

## 2017-12-21 LAB
BACTERIA SPEC AEROBE CULT: NORMAL
BACTERIA SPEC AEROBE CULT: NORMAL
CK MB SERPL-CCNC: 1.74 NG/ML (ref 0–5)
CK MB SERPL-RTO: 1.1 % (ref 0–3)
CK SERPL-CCNC: 162 U/L (ref 24–173)
D-LACTATE SERPL-SCNC: 1.5 MMOL/L (ref 0.5–2)
HOLD SPECIMEN: NORMAL
TROPONIN I SERPL-MCNC: 0.01 NG/ML

## 2017-12-21 PROCEDURE — 94799 UNLISTED PULMONARY SVC/PX: CPT

## 2017-12-21 PROCEDURE — 25010000002 MORPHINE PER 10 MG: Performed by: FAMILY MEDICINE

## 2017-12-21 PROCEDURE — 82550 ASSAY OF CK (CPK): CPT | Performed by: PHYSICIAN ASSISTANT

## 2017-12-21 PROCEDURE — 84484 ASSAY OF TROPONIN QUANT: CPT | Performed by: PHYSICIAN ASSISTANT

## 2017-12-21 PROCEDURE — 82553 CREATINE MB FRACTION: CPT | Performed by: PHYSICIAN ASSISTANT

## 2017-12-21 PROCEDURE — 96376 TX/PRO/DX INJ SAME DRUG ADON: CPT

## 2017-12-21 PROCEDURE — 96375 TX/PRO/DX INJ NEW DRUG ADDON: CPT

## 2017-12-21 PROCEDURE — 83605 ASSAY OF LACTIC ACID: CPT | Performed by: FAMILY MEDICINE

## 2017-12-21 RX ORDER — ACETAMINOPHEN AND CODEINE PHOSPHATE 300; 30 MG/1; MG/1
1 TABLET ORAL ONCE
Status: COMPLETED | OUTPATIENT
Start: 2017-12-21 | End: 2017-12-21

## 2017-12-21 RX ORDER — DOXYCYCLINE HYCLATE 100 MG/1
100 TABLET, DELAYED RELEASE ORAL 2 TIMES DAILY
Qty: 20 TABLET | Refills: 0 | Status: SHIPPED | OUTPATIENT
Start: 2017-12-21 | End: 2017-12-31

## 2017-12-21 RX ORDER — MORPHINE SULFATE 2 MG/ML
2 INJECTION, SOLUTION INTRAMUSCULAR; INTRAVENOUS ONCE
Status: COMPLETED | OUTPATIENT
Start: 2017-12-21 | End: 2017-12-21

## 2017-12-21 RX ORDER — PREDNISONE 10 MG/1
10 TABLET ORAL DAILY
Qty: 10 TABLET | Refills: 0 | Status: SHIPPED | OUTPATIENT
Start: 2017-12-21 | End: 2017-12-26

## 2017-12-21 RX ADMIN — MORPHINE SULFATE 2 MG: 2 INJECTION, SOLUTION INTRAMUSCULAR; INTRAVENOUS at 01:06

## 2017-12-21 RX ADMIN — IPRATROPIUM BROMIDE AND ALBUTEROL SULFATE 3 ML: .5; 3 SOLUTION RESPIRATORY (INHALATION) at 00:30

## 2017-12-21 RX ADMIN — ACETAMINOPHEN AND CODEINE PHOSPHATE 1 TABLET: 300; 30 TABLET ORAL at 02:38

## 2017-12-21 NOTE — ED NOTES
Mother is at bedside and states that there is no reason to send the patient home, that they need to put a new port a cath in tomorrow. Reports that she has an appointment with the surgeon outpatient to schedule to have it done. Patient reports no more chest pain or SOB at this time. Patient noted to remove oxygen at this time. Made PA aware.      Anabela Madison RN  12/21/17 0010

## 2017-12-21 NOTE — ED NOTES
Sepsis bolus completed at this time. Temp: 98.2 oral, , R 18, Pulse Ox 98 % on 2 lpm via nasal cannula, B/P: 142/90.      Anabela Madison RN  12/20/17 3548

## 2017-12-21 NOTE — ED NOTES
Made patient and mother aware that patient could not be admitted just to keep an IV access until the port a cath can be placed. Mother states that there is no reason to send her home if they will place on tomorrow. Explained once again that it is a consult with a surgeon, per the patient.      Anabela Madison RN  12/21/17 0011

## 2017-12-21 NOTE — ED NOTES
MAY HOWE aware that RN not able to gain IV access. Lead nurse attempted and was unsuccessful as well. Called [facility] to ask for them to bring the ultrasound machine and attempt IV access.      Anabela Madison RN  12/20/17 2046

## 2017-12-21 NOTE — ED NOTES
Patient lying in bed resting at this time. Patient aware of discharge and requests something else for pain before being discharged. PA is aware. NADN. WCTM. Resps even and unlabored. No cough noted at this time.      Anabela Madison RN  12/21/17 3851

## 2017-12-21 NOTE — ED PROVIDER NOTES
"Subjective   HPI Comments: Patient presents to the ED with complaints of worsening SOB since leaving AMA from the hospital on Sunday. Patient states she is having \"Lung pain\".  Patient reports hx of COPD and is a heavy smoker. Patient reports that she signed out AMA because someone messed her port up and they were unable to get IV access.  Patient stated that they wanted to transfer her so she just left. Patient states she has an appointment tomorrow to see a surgeon for port replacement.     Patient is a 57 y.o. female presenting with shortness of breath.   History provided by:  Patient   used: No    Shortness of Breath   Severity:  Moderate  Onset quality:  Sudden  Duration:  3 days  Timing:  Constant  Progression:  Worsening  Chronicity:  New  Relieved by:  Nothing  Worsened by:  Nothing  Ineffective treatments:  None tried  Associated symptoms: chest pain and wheezing    Risk factors: tobacco use    Risk factors: no recent alcohol use, no family hx of DVT, no hx of cancer, no hx of PE/DVT, no obesity, no oral contraceptive use, no prolonged immobilization and no recent surgery        Review of Systems   Constitutional: Negative.    HENT: Negative.    Eyes: Negative.    Respiratory: Positive for shortness of breath and wheezing.    Cardiovascular: Positive for chest pain.   Gastrointestinal: Negative.    Endocrine: Negative.    Genitourinary: Negative.    Musculoskeletal: Negative.    Skin: Negative.    Allergic/Immunologic: Negative.    Neurological: Negative.    Hematological: Negative.    Psychiatric/Behavioral: Negative.    All other systems reviewed and are negative.      Past Medical History:   Diagnosis Date   • Arthritis    • Asthma    • Chest pain    • COPD (chronic obstructive pulmonary disease)    • Depression    • Diabetes mellitus    • Emphysema of lung    • Emphysema of lung    • Injury of back    • Migraine    • Pneumonia    • Seizures    • Stroke        No Known " Allergies    Past Surgical History:   Procedure Laterality Date   • KIDNEY SURGERY      cyst removal   • PORTACATH PLACEMENT      left subclavian   • SINUS SURGERY         Family History   Problem Relation Age of Onset   • COPD Mother    • Diabetes Mother    • Diabetes Father        Social History     Social History   • Marital status: Single     Spouse name: N/A   • Number of children: N/A   • Years of education: N/A     Social History Main Topics   • Smoking status: Current Every Day Smoker     Packs/day: 0.50     Years: 25.00     Types: Cigarettes   • Smokeless tobacco: Never Used   • Alcohol use No   • Drug use: No   • Sexual activity: Defer     Other Topics Concern   • None     Social History Narrative           Objective   Physical Exam   Constitutional: She is oriented to person, place, and time. She appears well-developed and well-nourished.   HENT:   Head: Normocephalic and atraumatic.   Right Ear: External ear normal.   Left Ear: External ear normal.   Nose: Nose normal.   Mouth/Throat: Oropharynx is clear and moist.   Eyes: Conjunctivae and EOM are normal. Pupils are equal, round, and reactive to light.   Neck: Normal range of motion. Neck supple.   Cardiovascular: Normal rate, regular rhythm, normal heart sounds and intact distal pulses.    Pulmonary/Chest: Effort normal. She has wheezes.   Abdominal: Soft. Bowel sounds are normal.   Musculoskeletal: Normal range of motion.   Neurological: She is alert and oriented to person, place, and time.   Skin: Skin is warm and dry.   Psychiatric: She has a normal mood and affect. Her behavior is normal. Judgment and thought content normal.   Nursing note and vitals reviewed.      Procedures         ED Course  ED Course   Value Comment By Time   ECG 12 Lead 1914-Reviewed by Dr. Maria, rate 118, sinus tachycardia, no ischemia CARLEY Cuba 12/20 2145    Will contact Dr. Truong with repeat lactic. CARLEY Cuba 12/21 0054    Spoke with Dr. Truong  - she does not feel that this patient meets inpatient criteria because the lactic acid improved and afebrile.      Patient remarkable improved after breathing treatments and solumedrol. Patient ambulatory on her normal home O2 with no complications.  Patient has been up walking to and from the bathroom showing no respiratory distress. Patient was found multiple times her oxygen tubing laying in the floor and O2 sat maintaining above 92%. It appears that patient has not failed outpatient treatment and would be well suited to go home on antibiotics and steroids.  Patient states she does feel much better and would like to go home so she can make her appointment with the surgeon to get another port. CARLEY Cuba 12/21 0611                  [facility]    Final diagnoses:   COPD exacerbation            CARLEY Cuba  12/21/17 0612

## 2017-12-21 NOTE — ED NOTES
Patient asking to speak to someone in relation to if she will be admitted or not. PA states that if patient Lactic has improved, then Dr. Truong states that she will not admit, but if her lactic is still the same or worse, that she would admit. Patient updated on this POC. Verbalized understanding. Patient then requests something else for pain, made PA aware, no new orders at this time.      Anabela Madison RN  12/21/17 0049

## 2017-12-21 NOTE — ED NOTES
Patient noted to be ambulatory at this time to restroom with family. ALBIN. WCTM. Patient states that her breathing is much better now. No complaints of pain voiced. States that the Morphine helped her earlier. States that at times when she coughs that her chest will hurt some again. Updated on POC. Continues to wear oxygen at 2 lpm via nasal cannula.      Anabela Madison RN  12/21/17 0001

## 2017-12-21 NOTE — ED NOTES
TRISTA Lewis RN and DAT Madison RN attempted to access patient port to left upper chest with no success. Also attempted butterfly needle to left AC without success. Lead Nurse VIN Victoria RN made aware.      Anabela Madison RN  12/20/17 1950

## 2017-12-21 NOTE — ED NOTES
Patient sitting on side of bed at this time. NADN. WCTM. Resps even and unlabored. No complaints of chest pain noted at this time. Noted to have a congested cough. Continues to wear oxygen at 2 lpm via nasal cannula. Noted to be normal sinus tach on the monitor at this time with a rate of 112. Family noted to be at bedside at this time. Updated on POC. Appreciative.      Anabela Madison RN  12/20/17 0560

## 2017-12-25 LAB — BACTERIA SPEC AEROBE CULT: NORMAL

## 2017-12-26 LAB
BACTERIA SPEC AEROBE CULT: ABNORMAL
GRAM STN SPEC: ABNORMAL
ISOLATED FROM: ABNORMAL
ISOLATED FROM: ABNORMAL

## 2018-04-05 ENCOUNTER — OFFICE VISIT (OUTPATIENT)
Dept: SURGERY | Facility: CLINIC | Age: 58
End: 2018-04-05

## 2018-04-05 VITALS
WEIGHT: 178 LBS | HEIGHT: 65 IN | SYSTOLIC BLOOD PRESSURE: 121 MMHG | HEART RATE: 85 BPM | DIASTOLIC BLOOD PRESSURE: 79 MMHG | BODY MASS INDEX: 29.66 KG/M2

## 2018-04-05 DIAGNOSIS — J41.8 MIXED SIMPLE AND MUCOPURULENT CHRONIC BRONCHITIS (HCC): Primary | ICD-10-CM

## 2018-04-05 DIAGNOSIS — I87.8 POOR VENOUS ACCESS: ICD-10-CM

## 2018-04-05 PROCEDURE — 99203 OFFICE O/P NEW LOW 30 MIN: CPT | Performed by: SURGERY

## 2018-04-05 NOTE — PROGRESS NOTES
Subjective   Marissa Lau is a 57 y.o. female.     History of Present Illness She has COPD and has frequent admissions. She has very poor IV access. She has had a port in the left side for 11-12 years. It has stopped working recently.    The following portions of the patient's history were reviewed and updated as appropriate: current medications, past family history, past medical history, past social history, past surgical history and problem list.    Review of Systems   Constitutional: Negative for activity change, appetite change, chills, fever and unexpected weight change.   HENT: Negative for congestion, facial swelling and sore throat.    Eyes: Negative for photophobia and visual disturbance.   Respiratory: Positive for shortness of breath. Negative for chest tightness and wheezing.    Cardiovascular: Negative for chest pain, palpitations and leg swelling.   Gastrointestinal: Negative for abdominal distention, abdominal pain, anal bleeding, blood in stool, constipation, diarrhea, nausea, rectal pain and vomiting.   Endocrine: Negative for cold intolerance, heat intolerance, polydipsia and polyuria.   Genitourinary: Negative for difficulty urinating, dysuria, flank pain and urgency.   Musculoskeletal: Negative for back pain and myalgias.   Skin: Negative for rash and wound.   Allergic/Immunologic: Negative for immunocompromised state.   Neurological: Negative for dizziness, seizures, syncope, light-headedness, numbness and headaches.   Hematological: Negative for adenopathy. Does not bruise/bleed easily.   Psychiatric/Behavioral: Negative for behavioral problems and confusion. The patient is not nervous/anxious.        Objective   Physical Exam   Constitutional: She is oriented to person, place, and time. She appears well-developed and well-nourished. She does not appear ill. No distress.   HENT:   Head: Normocephalic. Head is without laceration. Hair is normal.   Right Ear: Hearing and ear canal normal.    Left Ear: Hearing and ear canal normal.   Nose: Nose normal. No sinus tenderness. No epistaxis. Right sinus exhibits no maxillary sinus tenderness and no frontal sinus tenderness. Left sinus exhibits no maxillary sinus tenderness and no frontal sinus tenderness.   Eyes: Conjunctivae and lids are normal. Pupils are equal, round, and reactive to light.   Neck: Normal range of motion. No JVD present. No tracheal tenderness present. No tracheal deviation present. No thyroid mass and no thyromegaly present.   Cardiovascular: Normal rate and regular rhythm.  Exam reveals no gallop.    No murmur heard.  Pulmonary/Chest: Effort normal. No stridor. She has wheezes. She exhibits no tenderness.   Abdominal: Soft. Bowel sounds are normal. She exhibits no distension, no ascites and no mass. There is no tenderness. There is no rebound and no guarding. No hernia.   Musculoskeletal: She exhibits no edema or deformity.   Lymphadenopathy:     She has no cervical adenopathy.     She has no axillary adenopathy.        Right: No inguinal and no supraclavicular adenopathy present.        Left: No inguinal and no supraclavicular adenopathy present.   Neurological: She is alert and oriented to person, place, and time. She exhibits normal muscle tone.   Skin: Skin is warm, dry and intact. No rash noted. No erythema. No pallor.   Psychiatric: She has a normal mood and affect. Her behavior is normal. Thought content normal.   Vitals reviewed.      Assessment/Plan   Marissa was seen today for consult for port placement.    Diagnoses and all orders for this visit:    Mixed simple and mucopurulent chronic bronchitis    Poor venous access    replace port    I advised the patient of the risks in continuing to use tobacco, and I provided this patient with smoking cessation educational materials.    During this visit, I spent < 3 minutes counseling the patient regarding smoking cessation.

## 2018-04-11 ENCOUNTER — APPOINTMENT (OUTPATIENT)
Dept: PREADMISSION TESTING | Facility: HOSPITAL | Age: 58
End: 2018-04-11

## 2018-04-11 LAB
ANION GAP SERPL CALCULATED.3IONS-SCNC: 3.5 MMOL/L (ref 3.6–11.2)
BUN BLD-MCNC: 10 MG/DL (ref 7–21)
BUN/CREAT SERPL: 11.4 (ref 7–25)
CALCIUM SPEC-SCNC: 9.6 MG/DL (ref 7.7–10)
CHLORIDE SERPL-SCNC: 109 MMOL/L (ref 99–112)
CO2 SERPL-SCNC: 26.5 MMOL/L (ref 24.3–31.9)
CREAT BLD-MCNC: 0.88 MG/DL (ref 0.43–1.29)
DEPRECATED RDW RBC AUTO: 42.6 FL (ref 37–54)
ERYTHROCYTE [DISTWIDTH] IN BLOOD BY AUTOMATED COUNT: 12.7 % (ref 11.5–14.5)
GFR SERPL CREATININE-BSD FRML MDRD: 67 ML/MIN/1.73
GLUCOSE BLD-MCNC: 106 MG/DL (ref 70–110)
HCT VFR BLD AUTO: 46.7 % (ref 37–47)
HGB BLD-MCNC: 15.8 G/DL (ref 12–16)
MCH RBC QN AUTO: 31.9 PG (ref 27–33)
MCHC RBC AUTO-ENTMCNC: 33.8 G/DL (ref 33–37)
MCV RBC AUTO: 94.2 FL (ref 80–94)
OSMOLALITY SERPL CALC.SUM OF ELEC: 277 MOSM/KG (ref 273–305)
PLATELET # BLD AUTO: 225 10*3/MM3 (ref 130–400)
PMV BLD AUTO: 10.4 FL (ref 6–10)
POTASSIUM BLD-SCNC: 4.1 MMOL/L (ref 3.5–5.3)
RBC # BLD AUTO: 4.96 10*6/MM3 (ref 4.2–5.4)
SODIUM BLD-SCNC: 139 MMOL/L (ref 135–153)
WBC NRBC COR # BLD: 5.92 10*3/MM3 (ref 4.5–12.5)

## 2018-04-11 PROCEDURE — 36415 COLL VENOUS BLD VENIPUNCTURE: CPT

## 2018-04-11 PROCEDURE — 80048 BASIC METABOLIC PNL TOTAL CA: CPT | Performed by: ANESTHESIOLOGY

## 2018-04-11 PROCEDURE — 85027 COMPLETE CBC AUTOMATED: CPT | Performed by: ANESTHESIOLOGY

## 2018-04-11 NOTE — DISCHARGE INSTRUCTIONS
1000             4/12/2018              ARRIVAL TIME    TAKE the following medications the morning of surgery:  All heart or blood pressure medications    HOLD all diabetic medications the morning of surgery as ordered by physician.    Please discontinue all blood thinners and anticoagulants (except aspirin) prior to surgery as per your surgeon and cardiologist instructions.  Aspirin may be continued up to the day prior to surgery.     CHLORHEXIDINE CLOTHS GIVEN WITH INSTRUCTIONS AND FORM TO RETURN TO HOSPITAL    General Instructions:  · Do not eat or drink after midnight: includes water, mints, or gum. You may brush your teeth.  Dental appliances that are removable must be taken out day of surgery.  · Do not smoke, chew tobacco, or drink alcohol.  · Bring medications in original bottles, any inhalers and if applicable your C-PAP/BI-PAP machine.  · Bring any papers given to you in the doctor's office.  · Wear clean comfortable clothes and socks.  · Do not wear contact lenses or make-up. Bring a case for your glasses if applicable.  · Bring crutches or walker if applicable.  · Leave all other valuables and jewelry at home.    If you were given a blood bank ID arm band remember to bring it with you the day of surgery.    Preventing a Surgical Site Infection:  Shower the night before surgery (unless instructed other wise) using a fresh bar of anti-bacterial soap (such as Dial) and clean washcloth. Dry with a clean towel and dress in clean clothing.  For 2 to 3 days before surgery, avoid shaving with a razor near where you will have surgery because the razor can irritate skin and make it easier to develop an infection. Ask your surgeon if you will be receiving antibiotics prior to surgery.  Make sure you, your family, and all healthcare providers clear their hands with soap and water or an alcohol-based hand  before caring for you or your wound.  If at all possible, quit smoking as many days before surgery as  you can.    Day of surgery:  Upon arrival, a Pre-op nurse and Anesthesiologist will review your health history, obtain vital signs, and answer questions you may have. The only belongings needed at this time will be your home medications and if applicable your C-PAP/BI-PAP machine. If you are staying overnight your family can leave the rest of your belongings in the car and bring them to your room later. A Pre-op nurse will start an IV and you may receive medication in preparation for surgery, including something to help you relax. Your family will be able to see you in the Pre-op area. While you are in surgery your family should notify the waiting room  if they leave the waiting room area and provide a contact phone number.    Please be aware that surgery does come with discomfort. We want to make every effort to control your discomfort so please discuss any uncontrolled symptoms with your nurse. Your doctor will most likely have prescribed pain medications.    If you are going home after surgery you will receive individualized written care instructions before being discharged. A responsible adult must drive you to and from the hospital on the day of surgery and stay with you for 24 hours.    If you are staying overnight following surgery, you will be transported to your hospital room following the recovery period.  [facility] has all private rooms.    If you have any questions please call Pre-Admission Testing at [phone].  Deductibles and co-payments are collected on the day of service. Please be prepared to pay the required co-pay, deductible or deposit on the day of service as defined by your plan.

## 2018-04-12 ENCOUNTER — ANESTHESIA (OUTPATIENT)
Dept: PERIOP | Facility: HOSPITAL | Age: 58
End: 2018-04-12

## 2018-04-12 ENCOUNTER — HOSPITAL ENCOUNTER (OUTPATIENT)
Facility: HOSPITAL | Age: 58
Setting detail: HOSPITAL OUTPATIENT SURGERY
Discharge: HOME | End: 2018-04-12
Attending: SURGERY | Admitting: SURGERY

## 2018-04-12 ENCOUNTER — APPOINTMENT (OUTPATIENT)
Dept: GENERAL RADIOLOGY | Facility: HOSPITAL | Age: 58
End: 2018-04-12

## 2018-04-12 ENCOUNTER — ANESTHESIA EVENT (OUTPATIENT)
Dept: PERIOP | Facility: HOSPITAL | Age: 58
End: 2018-04-12

## 2018-04-12 VITALS
RESPIRATION RATE: 16 BRPM | WEIGHT: 178 LBS | TEMPERATURE: 97.7 F | OXYGEN SATURATION: 95 % | BODY MASS INDEX: 29.66 KG/M2 | HEIGHT: 65 IN | HEART RATE: 92 BPM | DIASTOLIC BLOOD PRESSURE: 80 MMHG | SYSTOLIC BLOOD PRESSURE: 118 MMHG

## 2018-04-12 LAB — GLUCOSE BLDC GLUCOMTR-MCNC: 143 MG/DL (ref 70–130)

## 2018-04-12 PROCEDURE — 25010000002 MIDAZOLAM PER 1 MG: Performed by: NURSE ANESTHETIST, CERTIFIED REGISTERED

## 2018-04-12 PROCEDURE — 71045 X-RAY EXAM CHEST 1 VIEW: CPT | Performed by: RADIOLOGY

## 2018-04-12 PROCEDURE — 25010000002 FENTANYL CITRATE (PF) 100 MCG/2ML SOLUTION: Performed by: NURSE ANESTHETIST, CERTIFIED REGISTERED

## 2018-04-12 PROCEDURE — C1788 PORT, INDWELLING, IMP: HCPCS | Performed by: SURGERY

## 2018-04-12 PROCEDURE — 76000 FLUOROSCOPY <1 HR PHYS/QHP: CPT

## 2018-04-12 PROCEDURE — 36590 REMOVAL TUNNELED CV CATH: CPT | Performed by: SURGERY

## 2018-04-12 PROCEDURE — 76000 FLUOROSCOPY <1 HR PHYS/QHP: CPT | Performed by: RADIOLOGY

## 2018-04-12 PROCEDURE — 25010000002 ONDANSETRON PER 1 MG: Performed by: NURSE ANESTHETIST, CERTIFIED REGISTERED

## 2018-04-12 PROCEDURE — 36561 INSERT TUNNELED CV CATH: CPT | Performed by: SURGERY

## 2018-04-12 PROCEDURE — 77001 FLUOROGUIDE FOR VEIN DEVICE: CPT | Performed by: SURGERY

## 2018-04-12 PROCEDURE — 71045 X-RAY EXAM CHEST 1 VIEW: CPT

## 2018-04-12 PROCEDURE — 25010000002 HEPARIN FLUSH (PORCINE) 100 UNIT/ML SOLUTION: Performed by: SURGERY

## 2018-04-12 PROCEDURE — 25010000002 PHENYLEPHRINE PER 1 ML: Performed by: NURSE ANESTHETIST, CERTIFIED REGISTERED

## 2018-04-12 PROCEDURE — 82962 GLUCOSE BLOOD TEST: CPT

## 2018-04-12 PROCEDURE — 25010000002 DEXAMETHASONE PER 1 MG: Performed by: NURSE ANESTHETIST, CERTIFIED REGISTERED

## 2018-04-12 PROCEDURE — 25010000002 PROPOFOL 10 MG/ML EMULSION: Performed by: NURSE ANESTHETIST, CERTIFIED REGISTERED

## 2018-04-12 DEVICE — VACCESS CT POWER-INJECTABLE IMPLANTABLE PORT (WITH SUTURE PLUGS) (8F)
Type: IMPLANTABLE DEVICE | Status: FUNCTIONAL
Brand: VACCESS

## 2018-04-12 RX ORDER — SODIUM CHLORIDE, SODIUM LACTATE, POTASSIUM CHLORIDE, CALCIUM CHLORIDE 600; 310; 30; 20 MG/100ML; MG/100ML; MG/100ML; MG/100ML
125 INJECTION, SOLUTION INTRAVENOUS CONTINUOUS
Status: DISCONTINUED | OUTPATIENT
Start: 2018-04-12 | End: 2018-04-12 | Stop reason: HOSPADM

## 2018-04-12 RX ORDER — FENTANYL CITRATE 50 UG/ML
50 INJECTION, SOLUTION INTRAMUSCULAR; INTRAVENOUS
Status: DISCONTINUED | OUTPATIENT
Start: 2018-04-12 | End: 2018-04-12 | Stop reason: HOSPADM

## 2018-04-12 RX ORDER — MIDAZOLAM HYDROCHLORIDE 1 MG/ML
INJECTION INTRAMUSCULAR; INTRAVENOUS AS NEEDED
Status: DISCONTINUED | OUTPATIENT
Start: 2018-04-12 | End: 2018-04-12 | Stop reason: SURG

## 2018-04-12 RX ORDER — MEPERIDINE HYDROCHLORIDE 50 MG/ML
12.5 INJECTION INTRAMUSCULAR; INTRAVENOUS; SUBCUTANEOUS
Status: DISCONTINUED | OUTPATIENT
Start: 2018-04-12 | End: 2018-04-12 | Stop reason: HOSPADM

## 2018-04-12 RX ORDER — IPRATROPIUM BROMIDE AND ALBUTEROL SULFATE 2.5; .5 MG/3ML; MG/3ML
3 SOLUTION RESPIRATORY (INHALATION) ONCE AS NEEDED
Status: DISCONTINUED | OUTPATIENT
Start: 2018-04-12 | End: 2018-04-12 | Stop reason: HOSPADM

## 2018-04-12 RX ORDER — DEXAMETHASONE SODIUM PHOSPHATE 10 MG/ML
INJECTION INTRAMUSCULAR; INTRAVENOUS AS NEEDED
Status: DISCONTINUED | OUTPATIENT
Start: 2018-04-12 | End: 2018-04-12 | Stop reason: SURG

## 2018-04-12 RX ORDER — FENTANYL CITRATE 50 UG/ML
INJECTION, SOLUTION INTRAMUSCULAR; INTRAVENOUS AS NEEDED
Status: DISCONTINUED | OUTPATIENT
Start: 2018-04-12 | End: 2018-04-12 | Stop reason: SURG

## 2018-04-12 RX ORDER — HYDROCODONE BITARTRATE AND ACETAMINOPHEN 5; 325 MG/1; MG/1
1 TABLET ORAL EVERY 4 HOURS PRN
Status: DISCONTINUED | OUTPATIENT
Start: 2018-04-12 | End: 2018-04-12 | Stop reason: HOSPADM

## 2018-04-12 RX ORDER — PROPOFOL 10 MG/ML
VIAL (ML) INTRAVENOUS CONTINUOUS PRN
Status: DISCONTINUED | OUTPATIENT
Start: 2018-04-12 | End: 2018-04-12 | Stop reason: SURG

## 2018-04-12 RX ORDER — ONDANSETRON 2 MG/ML
INJECTION INTRAMUSCULAR; INTRAVENOUS AS NEEDED
Status: DISCONTINUED | OUTPATIENT
Start: 2018-04-12 | End: 2018-04-12 | Stop reason: SURG

## 2018-04-12 RX ORDER — OXYCODONE HYDROCHLORIDE AND ACETAMINOPHEN 5; 325 MG/1; MG/1
1 TABLET ORAL ONCE AS NEEDED
Status: DISCONTINUED | OUTPATIENT
Start: 2018-04-12 | End: 2018-04-12 | Stop reason: HOSPADM

## 2018-04-12 RX ORDER — FAMOTIDINE 10 MG/ML
INJECTION, SOLUTION INTRAVENOUS AS NEEDED
Status: DISCONTINUED | OUTPATIENT
Start: 2018-04-12 | End: 2018-04-12 | Stop reason: SURG

## 2018-04-12 RX ORDER — HYDROCODONE BITARTRATE AND ACETAMINOPHEN 5; 325 MG/1; MG/1
1 TABLET ORAL EVERY 4 HOURS PRN
Qty: 30 TABLET | Refills: 0 | Status: SHIPPED | OUTPATIENT
Start: 2018-04-12 | End: 2018-04-22

## 2018-04-12 RX ORDER — MAGNESIUM HYDROXIDE 1200 MG/15ML
LIQUID ORAL AS NEEDED
Status: DISCONTINUED | OUTPATIENT
Start: 2018-04-12 | End: 2018-04-12 | Stop reason: HOSPADM

## 2018-04-12 RX ORDER — LIDOCAINE HYDROCHLORIDE 10 MG/ML
INJECTION, SOLUTION INFILTRATION; PERINEURAL AS NEEDED
Status: DISCONTINUED | OUTPATIENT
Start: 2018-04-12 | End: 2018-04-12 | Stop reason: HOSPADM

## 2018-04-12 RX ORDER — SODIUM CHLORIDE 0.9 % (FLUSH) 0.9 %
1-10 SYRINGE (ML) INJECTION AS NEEDED
Status: DISCONTINUED | OUTPATIENT
Start: 2018-04-12 | End: 2018-04-12 | Stop reason: HOSPADM

## 2018-04-12 RX ORDER — LIDOCAINE HYDROCHLORIDE 10 MG/ML
INJECTION, SOLUTION INFILTRATION; PERINEURAL AS NEEDED
Status: DISCONTINUED | OUTPATIENT
Start: 2018-04-12 | End: 2018-04-12 | Stop reason: SURG

## 2018-04-12 RX ORDER — ONDANSETRON 2 MG/ML
4 INJECTION INTRAMUSCULAR; INTRAVENOUS ONCE AS NEEDED
Status: DISCONTINUED | OUTPATIENT
Start: 2018-04-12 | End: 2018-04-12 | Stop reason: HOSPADM

## 2018-04-12 RX ADMIN — MIDAZOLAM HYDROCHLORIDE 2 MG: 1 INJECTION, SOLUTION INTRAMUSCULAR; INTRAVENOUS at 08:20

## 2018-04-12 RX ADMIN — PHENYLEPHRINE HYDROCHLORIDE 100 MCG: 10 INJECTION, SOLUTION INTRAMUSCULAR; INTRAVENOUS; SUBCUTANEOUS at 08:53

## 2018-04-12 RX ADMIN — DEXAMETHASONE SODIUM PHOSPHATE 4 MG: 10 INJECTION INTRAMUSCULAR; INTRAVENOUS at 08:32

## 2018-04-12 RX ADMIN — FAMOTIDINE 20 MG: 10 INJECTION, SOLUTION INTRAVENOUS at 08:32

## 2018-04-12 RX ADMIN — FENTANYL CITRATE 50 MCG: 50 INJECTION, SOLUTION INTRAMUSCULAR; INTRAVENOUS at 09:10

## 2018-04-12 RX ADMIN — LIDOCAINE HYDROCHLORIDE 60 MG: 10 INJECTION, SOLUTION INFILTRATION; PERINEURAL at 08:20

## 2018-04-12 RX ADMIN — PROPOFOL 100 MCG/KG/MIN: 10 INJECTION, EMULSION INTRAVENOUS at 08:24

## 2018-04-12 RX ADMIN — SODIUM CHLORIDE, POTASSIUM CHLORIDE, SODIUM LACTATE AND CALCIUM CHLORIDE: 600; 310; 30; 20 INJECTION, SOLUTION INTRAVENOUS at 08:20

## 2018-04-12 RX ADMIN — PHENYLEPHRINE HYDROCHLORIDE 100 MCG: 10 INJECTION, SOLUTION INTRAMUSCULAR; INTRAVENOUS; SUBCUTANEOUS at 08:27

## 2018-04-12 RX ADMIN — FENTANYL CITRATE 100 MCG: 50 INJECTION INTRAMUSCULAR; INTRAVENOUS at 08:20

## 2018-04-12 RX ADMIN — PHENYLEPHRINE HYDROCHLORIDE 100 MCG: 10 INJECTION, SOLUTION INTRAMUSCULAR; INTRAVENOUS; SUBCUTANEOUS at 08:40

## 2018-04-12 RX ADMIN — ONDANSETRON 4 MG: 2 INJECTION, SOLUTION INTRAMUSCULAR; INTRAVENOUS at 08:32

## 2018-04-12 RX ADMIN — ONDANSETRON 4 MG: 2 INJECTION, SOLUTION INTRAMUSCULAR; INTRAVENOUS at 09:13

## 2018-04-12 NOTE — ANESTHESIA PREPROCEDURE EVALUATION
Anesthesia Evaluation     Patient summary reviewed and Nursing notes reviewed   no history of anesthetic complications:  NPO Solid Status: > 8 hours  NPO Liquid Status: > 8 hours           Airway   Mallampati: II  TM distance: <3 FB  Neck ROM: full  Possible difficult intubation, Small opening and Anterior  Dental - normal exam     Pulmonary - normal exam   (+) pneumonia (Dec 2017) resolved , a smoker Current Smoked day of surgery, COPD, asthma, sleep apnea,   Cardiovascular - normal exam  Exercise tolerance: good (4-7 METS)    NYHA Classification: II    (+) hypertension, dysrhythmias,   (-) past MI, angina, CHF      Neuro/Psych  (+) seizures (2016), CVA, headaches, psychiatric history Anxiety and Depression,     GI/Hepatic/Renal/Endo    (+)  GERD,  diabetes mellitus,   (-) liver disease, no renal disease, hypothyroidism    Musculoskeletal     (+) back pain,   Abdominal  - normal exam    Bowel sounds: normal.   Substance History - negative use  (-) alcohol use, drug use     OB/GYN negative ob/gyn ROS         Other   (+) arthritis                     Anesthesia Plan    ASA 3     general     intravenous induction   Anesthetic plan and risks discussed with patient.  Use of blood products discussed with patient  Consented to blood products.

## 2018-04-12 NOTE — OP NOTE
INSERTION AND REMOVAL OF VENOUS ACCESS DEVICE  Procedure Note    Marissa Lau  4/12/2018    Pre-op Diagnosis:   Mixed simple and mucopurulent chronic bronchitis [J41.8]  Poor venous access [I87.8]    Post-op Diagnosis: same        Procedure(s):  INSERTION AND REMOVAL OF VENOUS ACCESS DEVICE    Surgeon(s):  Evaristo Stanley MD    Anesthesia: Choice    Staff:   Circulator: Glenys Godinez RN  Radiology Technologist: Kenney Nicole  Scrub Person: Marissa Ibrahim  Assistant: Jose Jiménez CSA    Estimated Blood Loss: minimal    Specimens:                * No orders in the log *      Drains:  none    Procedure: The neck and shoulders were prepped and draped. The right subclavian area injected with local. The vein accessed and wire threaded in. Initially it went up the IJ but with c arm was able to be manipulated down the SVC. The port pocket was then made with cautery and the subcutaneous fat thinned out some. The port was sutured in and flushed and had good blood return. It was in position with the C arm. The subcutaneous tisued closed with vicryl and skin with vicryl.   The left side port was removed with local and freeing it up with the scalpel. The port space was  Closed with vicryl and a dressing applied.     Findings: normal    Complications: none   Grafts / Implants N/A    Evaristo Stanley MD     Date: 4/12/2018  Time: 8:55 AM

## 2018-04-12 NOTE — ANESTHESIA POSTPROCEDURE EVALUATION
Patient: Marissa Lau    Procedure Summary     Date:  04/12/18 Room / Location:  [facility] OR 01 /  [facility] OR    Anesthesia Start:  0821 Anesthesia Stop:  0858    Procedure:  INSERTION AND REMOVAL OF VENOUS ACCESS DEVICE (N/A ) Diagnosis:       Mixed simple and mucopurulent chronic bronchitis      Poor venous access      (Mixed simple and mucopurulent chronic bronchitis [J41.8])      (Poor venous access [I87.8])    Surgeon:  Evaristo Stanley MD Provider:  Andrey Conteh MD    Anesthesia Type:  general ASA Status:  3          Anesthesia Type: general  Last vitals  BP   116/72 (04/12/18 0734)   Temp   97.5 °F (36.4 °C) (04/12/18 0734)   Pulse   95 (04/12/18 0734)   Resp   20 (04/12/18 0734)     SpO2   95 % (04/12/18 0734)     Post Anesthesia Care and Evaluation    Patient location during evaluation: PHASE II  Patient participation: complete - patient participated  Level of consciousness: awake and alert  Pain score: 1  Pain management: adequate  Airway patency: patent  Anesthetic complications: No anesthetic complications  PONV Status: controlled  Cardiovascular status: acceptable  Respiratory status: acceptable  Hydration status: acceptable

## 2018-04-19 ENCOUNTER — OFFICE VISIT (OUTPATIENT)
Dept: SURGERY | Facility: CLINIC | Age: 58
End: 2018-04-19

## 2018-04-19 VITALS
HEART RATE: 88 BPM | SYSTOLIC BLOOD PRESSURE: 135 MMHG | DIASTOLIC BLOOD PRESSURE: 90 MMHG | WEIGHT: 178 LBS | HEIGHT: 65 IN | BODY MASS INDEX: 29.66 KG/M2

## 2018-04-19 DIAGNOSIS — I87.8 POOR VENOUS ACCESS: Primary | ICD-10-CM

## 2018-04-19 PROCEDURE — 99024 POSTOP FOLLOW-UP VISIT: CPT | Performed by: SURGERY

## 2018-04-19 NOTE — PROGRESS NOTES
Subjective   Marissa Lau is a 57 y.o. female.     History of Present Illness She had the old port removed and a new one placed last week.     The following portions of the patient's history were reviewed and updated as appropriate: current medications, past family history, past medical history, past social history, past surgical history and problem list.    Review of Systems    Objective   Physical Exam wounds ok    Assessment/Plan   Marissa was seen today for post-op port placement.    Diagnoses and all orders for this visit:    Poor venous access    return prn

## 2018-05-03 ENCOUNTER — TRANSCRIBE ORDERS (OUTPATIENT)
Dept: ADMINISTRATIVE | Facility: HOSPITAL | Age: 58
End: 2018-05-03

## 2018-05-03 DIAGNOSIS — R10.9 ABDOMINAL PAIN, UNSPECIFIED ABDOMINAL LOCATION: Primary | ICD-10-CM

## 2018-05-07 ENCOUNTER — APPOINTMENT (OUTPATIENT)
Dept: CT IMAGING | Facility: HOSPITAL | Age: 58
End: 2018-05-07

## 2018-05-11 ENCOUNTER — APPOINTMENT (OUTPATIENT)
Dept: CT IMAGING | Facility: HOSPITAL | Age: 58
End: 2018-05-11

## 2018-06-12 ENCOUNTER — LAB (OUTPATIENT)
Dept: LAB | Facility: HOSPITAL | Age: 58
End: 2018-06-12

## 2018-06-12 ENCOUNTER — TRANSCRIBE ORDERS (OUTPATIENT)
Dept: ADMINISTRATIVE | Facility: HOSPITAL | Age: 58
End: 2018-06-12

## 2018-06-12 ENCOUNTER — HOSPITAL ENCOUNTER (OUTPATIENT)
Dept: INFUSION THERAPY | Facility: HOSPITAL | Age: 58
Discharge: HOME | End: 2018-06-12

## 2018-06-12 ENCOUNTER — APPOINTMENT (OUTPATIENT)
Dept: CT IMAGING | Facility: HOSPITAL | Age: 58
End: 2018-06-12

## 2018-06-12 DIAGNOSIS — R10.31 ABDOMINAL PAIN, RIGHT LOWER QUADRANT: ICD-10-CM

## 2018-06-12 DIAGNOSIS — R10.31 ABDOMINAL PAIN, RIGHT LOWER QUADRANT: Primary | ICD-10-CM

## 2018-06-12 DIAGNOSIS — J42 CHRONIC BRONCHITIS, UNSPECIFIED CHRONIC BRONCHITIS TYPE (HCC): ICD-10-CM

## 2018-06-12 DIAGNOSIS — R10.32 LEFT LOWER QUADRANT PAIN: ICD-10-CM

## 2018-06-12 DIAGNOSIS — M79.604 PAIN IN RIGHT LEG: ICD-10-CM

## 2018-06-12 DIAGNOSIS — I87.8 POOR VENOUS ACCESS: ICD-10-CM

## 2018-06-12 LAB
25(OH)D3 SERPL-MCNC: 16 NG/ML
BASOPHILS # BLD AUTO: 0.05 10*3/MM3 (ref 0–0.3)
BASOPHILS NFR BLD AUTO: 0.7 % (ref 0–2)
CHOLEST SERPL-MCNC: 253 MG/DL (ref 0–200)
DEPRECATED RDW RBC AUTO: 43.4 FL (ref 37–54)
EOSINOPHIL # BLD AUTO: 0.18 10*3/MM3 (ref 0–0.7)
EOSINOPHIL NFR BLD AUTO: 2.7 % (ref 0–5)
ERYTHROCYTE [DISTWIDTH] IN BLOOD BY AUTOMATED COUNT: 13.1 % (ref 11.5–14.5)
HBA1C MFR BLD: 5.7 % (ref 4.5–5.7)
HCT VFR BLD AUTO: 44.9 % (ref 37–47)
HDLC SERPL-MCNC: 48 MG/DL (ref 60–100)
HGB BLD-MCNC: 15.4 G/DL (ref 12–16)
IMM GRANULOCYTES # BLD: 0.01 10*3/MM3 (ref 0–0.03)
IMM GRANULOCYTES NFR BLD: 0.1 % (ref 0–0.5)
LDLC SERPL CALC-MCNC: 177 MG/DL (ref 0–100)
LDLC/HDLC SERPL: 3.7 {RATIO}
LYMPHOCYTES # BLD AUTO: 2.33 10*3/MM3 (ref 1–3)
LYMPHOCYTES NFR BLD AUTO: 34.5 % (ref 21–51)
MCH RBC QN AUTO: 31.2 PG (ref 27–33)
MCHC RBC AUTO-ENTMCNC: 34.3 G/DL (ref 33–37)
MCV RBC AUTO: 91.1 FL (ref 80–94)
MONOCYTES # BLD AUTO: 0.49 10*3/MM3 (ref 0.1–0.9)
MONOCYTES NFR BLD AUTO: 7.2 % (ref 0–10)
NEUTROPHILS # BLD AUTO: 3.7 10*3/MM3 (ref 1.4–6.5)
NEUTROPHILS NFR BLD AUTO: 54.8 % (ref 30–70)
NRBC BLD MANUAL-RTO: 0 /100 WBC (ref 0–0)
PLATELET # BLD AUTO: 229 10*3/MM3 (ref 130–400)
PMV BLD AUTO: 10.1 FL (ref 6–10)
RBC # BLD AUTO: 4.93 10*6/MM3 (ref 4.2–5.4)
TRIGL SERPL-MCNC: 138 MG/DL (ref 0–150)
TSH SERPL DL<=0.05 MIU/L-ACNC: 1.02 MIU/ML (ref 0.55–4.78)
VIT B12 BLD-MCNC: 412 PG/ML (ref 211–911)
VLDLC SERPL-MCNC: 27.6 MG/DL
WBC NRBC COR # BLD: 6.76 10*3/MM3 (ref 4.5–12.5)

## 2018-06-12 PROCEDURE — 36415 COLL VENOUS BLD VENIPUNCTURE: CPT

## 2018-06-12 PROCEDURE — 82306 VITAMIN D 25 HYDROXY: CPT

## 2018-06-12 PROCEDURE — 85025 COMPLETE CBC W/AUTO DIFF WBC: CPT

## 2018-06-12 PROCEDURE — 82607 VITAMIN B-12: CPT

## 2018-06-12 PROCEDURE — 36591 DRAW BLOOD OFF VENOUS DEVICE: CPT

## 2018-06-12 PROCEDURE — 80061 LIPID PANEL: CPT

## 2018-06-12 PROCEDURE — 83036 HEMOGLOBIN GLYCOSYLATED A1C: CPT

## 2018-06-12 PROCEDURE — 84443 ASSAY THYROID STIM HORMONE: CPT

## 2018-06-12 RX ORDER — SODIUM CHLORIDE 0.9 % (FLUSH) 0.9 %
20 SYRINGE (ML) INJECTION AS NEEDED
Status: DISCONTINUED | OUTPATIENT
Start: 2018-06-12 | End: 2018-06-14 | Stop reason: HOSPADM

## 2018-06-12 RX ORDER — SODIUM CHLORIDE 0.9 % (FLUSH) 0.9 %
10 SYRINGE (ML) INJECTION AS NEEDED
Status: DISCONTINUED | OUTPATIENT
Start: 2018-06-12 | End: 2018-06-14 | Stop reason: HOSPADM

## 2018-06-14 ENCOUNTER — APPOINTMENT (OUTPATIENT)
Dept: GENERAL RADIOLOGY | Facility: HOSPITAL | Age: 58
End: 2018-06-14

## 2018-06-14 ENCOUNTER — HOSPITAL ENCOUNTER (OUTPATIENT)
Facility: HOSPITAL | Age: 58
Setting detail: OBSERVATION
Discharge: LEFT BEFORE DISCHARGE | End: 2018-06-16
Attending: EMERGENCY MEDICINE | Admitting: INTERNAL MEDICINE

## 2018-06-14 DIAGNOSIS — R07.9 CHEST PAIN, UNSPECIFIED TYPE: Primary | ICD-10-CM

## 2018-06-14 DIAGNOSIS — M25.552 PAIN OF LEFT HIP JOINT: ICD-10-CM

## 2018-06-14 LAB
ALBUMIN SERPL-MCNC: 3.8 G/DL (ref 3.5–5)
ALBUMIN/GLOB SERPL: 1.3 G/DL (ref 1.5–2.5)
ALP SERPL-CCNC: 93 U/L (ref 35–104)
ALT SERPL W P-5'-P-CCNC: 7 U/L (ref 10–36)
ANION GAP SERPL CALCULATED.3IONS-SCNC: 3.5 MMOL/L (ref 3.6–11.2)
AST SERPL-CCNC: 16 U/L (ref 10–30)
BILIRUB SERPL-MCNC: 0.3 MG/DL (ref 0.2–1.8)
BNP SERPL-MCNC: 7 PG/ML (ref 0–100)
BUN BLD-MCNC: 11 MG/DL (ref 7–21)
BUN/CREAT SERPL: 11.5 (ref 7–25)
CALCIUM SPEC-SCNC: 8.7 MG/DL (ref 7.7–10)
CHLORIDE SERPL-SCNC: 111 MMOL/L (ref 99–112)
CK MB SERPL-CCNC: 1.4 NG/ML (ref 0–5)
CK MB SERPL-RTO: 1.6 % (ref 0–3)
CK SERPL-CCNC: 88 U/L (ref 24–173)
CO2 SERPL-SCNC: 22.5 MMOL/L (ref 24.3–31.9)
CREAT BLD-MCNC: 0.96 MG/DL (ref 0.43–1.29)
D DIMER PPP FEU-MCNC: 0.33 MCGFEU/ML (ref 0–0.5)
DEPRECATED RDW RBC AUTO: 47.2 FL (ref 37–54)
EOSINOPHIL # BLD MANUAL: 0.24 10*3/MM3 (ref 0–0.7)
EOSINOPHIL NFR BLD MANUAL: 4 % (ref 0–5)
ERYTHROCYTE [DISTWIDTH] IN BLOOD BY AUTOMATED COUNT: 13.3 % (ref 11.5–14.5)
GFR SERPL CREATININE-BSD FRML MDRD: 60 ML/MIN/1.73
GLOBULIN UR ELPH-MCNC: 2.9 GM/DL
GLUCOSE BLD-MCNC: 94 MG/DL (ref 70–110)
GLUCOSE BLDC GLUCOMTR-MCNC: 107 MG/DL (ref 70–130)
HCT VFR BLD AUTO: 44.2 % (ref 37–47)
HGB BLD-MCNC: 14.3 G/DL (ref 12–16)
HOLD SPECIMEN: NORMAL
HOLD SPECIMEN: NORMAL
HYPOCHROMIA BLD QL: ABNORMAL
INR PPP: 0.91 (ref 0.9–1.1)
LIPASE SERPL-CCNC: 36 U/L (ref 13–60)
LYMPHOCYTES # BLD MANUAL: 2.38 10*3/MM3 (ref 1–3)
LYMPHOCYTES NFR BLD MANUAL: 39 % (ref 21–51)
LYMPHOCYTES NFR BLD MANUAL: 5 % (ref 0–10)
MACROCYTES BLD QL SMEAR: ABNORMAL
MCH RBC QN AUTO: 31.1 PG (ref 27–33)
MCHC RBC AUTO-ENTMCNC: 32.4 G/DL (ref 33–37)
MCV RBC AUTO: 96.1 FL (ref 80–94)
MONOCYTES # BLD AUTO: 0.31 10*3/MM3 (ref 0.1–0.9)
NEUTROPHILS # BLD AUTO: 3.18 10*3/MM3 (ref 1.4–6.5)
NEUTROPHILS NFR BLD MANUAL: 51 % (ref 30–70)
NEUTS BAND NFR BLD MANUAL: 1 % (ref 4–12)
OSMOLALITY SERPL CALC.SUM OF ELEC: 273 MOSM/KG (ref 273–305)
PLAT MORPH BLD: NORMAL
PLATELET # BLD AUTO: 199 10*3/MM3 (ref 130–400)
PMV BLD AUTO: 10.3 FL (ref 6–10)
POTASSIUM BLD-SCNC: 3.8 MMOL/L (ref 3.5–5.3)
PROT SERPL-MCNC: 6.7 G/DL (ref 6–8)
PROTHROMBIN TIME: 12.5 SECONDS (ref 11–15.4)
RBC # BLD AUTO: 4.6 10*6/MM3 (ref 4.2–5.4)
SCAN SLIDE: NORMAL
SODIUM BLD-SCNC: 137 MMOL/L (ref 135–153)
TROPONIN I SERPL-MCNC: <0.006 NG/ML
TROPONIN I SERPL-MCNC: <0.006 NG/ML
WBC NRBC COR # BLD: 6.11 10*3/MM3 (ref 4.5–12.5)
WHOLE BLOOD HOLD SPECIMEN: NORMAL
WHOLE BLOOD HOLD SPECIMEN: NORMAL

## 2018-06-14 PROCEDURE — G0378 HOSPITAL OBSERVATION PER HR: HCPCS

## 2018-06-14 PROCEDURE — 83690 ASSAY OF LIPASE: CPT | Performed by: EMERGENCY MEDICINE

## 2018-06-14 PROCEDURE — 82550 ASSAY OF CK (CPK): CPT | Performed by: PHYSICIAN ASSISTANT

## 2018-06-14 PROCEDURE — 85007 BL SMEAR W/DIFF WBC COUNT: CPT | Performed by: EMERGENCY MEDICINE

## 2018-06-14 PROCEDURE — 73502 X-RAY EXAM HIP UNI 2-3 VIEWS: CPT

## 2018-06-14 PROCEDURE — 85025 COMPLETE CBC W/AUTO DIFF WBC: CPT | Performed by: EMERGENCY MEDICINE

## 2018-06-14 PROCEDURE — 85379 FIBRIN DEGRADATION QUANT: CPT | Performed by: EMERGENCY MEDICINE

## 2018-06-14 PROCEDURE — 84484 ASSAY OF TROPONIN QUANT: CPT | Performed by: EMERGENCY MEDICINE

## 2018-06-14 PROCEDURE — 82962 GLUCOSE BLOOD TEST: CPT

## 2018-06-14 PROCEDURE — 96361 HYDRATE IV INFUSION ADD-ON: CPT

## 2018-06-14 PROCEDURE — 99284 EMERGENCY DEPT VISIT MOD MDM: CPT

## 2018-06-14 PROCEDURE — 94799 UNLISTED PULMONARY SVC/PX: CPT

## 2018-06-14 PROCEDURE — 84484 ASSAY OF TROPONIN QUANT: CPT | Performed by: PHYSICIAN ASSISTANT

## 2018-06-14 PROCEDURE — 83880 ASSAY OF NATRIURETIC PEPTIDE: CPT | Performed by: EMERGENCY MEDICINE

## 2018-06-14 PROCEDURE — 71045 X-RAY EXAM CHEST 1 VIEW: CPT | Performed by: RADIOLOGY

## 2018-06-14 PROCEDURE — 71045 X-RAY EXAM CHEST 1 VIEW: CPT

## 2018-06-14 PROCEDURE — 93005 ELECTROCARDIOGRAM TRACING: CPT | Performed by: EMERGENCY MEDICINE

## 2018-06-14 PROCEDURE — 25010000002 HEPARIN (PORCINE) PER 1000 UNITS: Performed by: PHYSICIAN ASSISTANT

## 2018-06-14 PROCEDURE — 99219 PR INITIAL OBSERVATION CARE/DAY 50 MINUTES: CPT | Performed by: PHYSICIAN ASSISTANT

## 2018-06-14 PROCEDURE — 82553 CREATINE MB FRACTION: CPT | Performed by: PHYSICIAN ASSISTANT

## 2018-06-14 PROCEDURE — 80053 COMPREHEN METABOLIC PANEL: CPT | Performed by: EMERGENCY MEDICINE

## 2018-06-14 PROCEDURE — 73502 X-RAY EXAM HIP UNI 2-3 VIEWS: CPT | Performed by: RADIOLOGY

## 2018-06-14 PROCEDURE — 93010 ELECTROCARDIOGRAM REPORT: CPT | Performed by: INTERNAL MEDICINE

## 2018-06-14 PROCEDURE — 96372 THER/PROPH/DIAG INJ SC/IM: CPT

## 2018-06-14 PROCEDURE — 96360 HYDRATION IV INFUSION INIT: CPT

## 2018-06-14 PROCEDURE — 85610 PROTHROMBIN TIME: CPT | Performed by: EMERGENCY MEDICINE

## 2018-06-14 RX ORDER — NITROGLYCERIN 0.4 MG/1
0.4 TABLET SUBLINGUAL
Status: DISCONTINUED | OUTPATIENT
Start: 2018-06-14 | End: 2018-06-16 | Stop reason: HOSPADM

## 2018-06-14 RX ORDER — IPRATROPIUM BROMIDE AND ALBUTEROL SULFATE 2.5; .5 MG/3ML; MG/3ML
3 SOLUTION RESPIRATORY (INHALATION)
Status: DISCONTINUED | OUTPATIENT
Start: 2018-06-14 | End: 2018-06-16 | Stop reason: HOSPADM

## 2018-06-14 RX ORDER — PANTOPRAZOLE SODIUM 40 MG/1
40 TABLET, DELAYED RELEASE ORAL DAILY
Status: DISCONTINUED | OUTPATIENT
Start: 2018-06-15 | End: 2018-06-16 | Stop reason: HOSPADM

## 2018-06-14 RX ORDER — GABAPENTIN 300 MG/1
300 CAPSULE ORAL 3 TIMES DAILY
Status: CANCELLED | OUTPATIENT
Start: 2018-06-14

## 2018-06-14 RX ORDER — ASPIRIN 81 MG/1
81 TABLET ORAL DAILY
Status: DISCONTINUED | OUTPATIENT
Start: 2018-06-14 | End: 2018-06-14

## 2018-06-14 RX ORDER — METOPROLOL SUCCINATE 50 MG/1
50 TABLET, EXTENDED RELEASE ORAL DAILY
Status: DISCONTINUED | OUTPATIENT
Start: 2018-06-15 | End: 2018-06-16 | Stop reason: HOSPADM

## 2018-06-14 RX ORDER — CETIRIZINE HYDROCHLORIDE 10 MG/1
10 TABLET ORAL DAILY
Status: DISCONTINUED | OUTPATIENT
Start: 2018-06-15 | End: 2018-06-16 | Stop reason: HOSPADM

## 2018-06-14 RX ORDER — ROPINIROLE 1 MG/1
1 TABLET, FILM COATED ORAL NIGHTLY
Status: DISCONTINUED | OUTPATIENT
Start: 2018-06-14 | End: 2018-06-16 | Stop reason: HOSPADM

## 2018-06-14 RX ORDER — CLONAZEPAM 0.5 MG/1
0.5 TABLET ORAL 2 TIMES DAILY
Status: CANCELLED | OUTPATIENT
Start: 2018-06-14

## 2018-06-14 RX ORDER — NICOTINE POLACRILEX 4 MG
15 LOZENGE BUCCAL
Status: DISCONTINUED | OUTPATIENT
Start: 2018-06-14 | End: 2018-06-16 | Stop reason: HOSPADM

## 2018-06-14 RX ORDER — ASPIRIN 325 MG
325 TABLET ORAL DAILY
Status: DISCONTINUED | OUTPATIENT
Start: 2018-06-15 | End: 2018-06-16 | Stop reason: HOSPADM

## 2018-06-14 RX ORDER — SODIUM CHLORIDE 9 MG/ML
INJECTION, SOLUTION INTRAVENOUS
Status: DISPENSED
Start: 2018-06-14 | End: 2018-06-15

## 2018-06-14 RX ORDER — ASPIRIN 325 MG
325 TABLET ORAL DAILY
COMMUNITY

## 2018-06-14 RX ORDER — METOCLOPRAMIDE 10 MG/1
10 TABLET ORAL
COMMUNITY

## 2018-06-14 RX ORDER — FAMOTIDINE 20 MG/1
20 TABLET, FILM COATED ORAL 2 TIMES DAILY
Status: DISCONTINUED | OUTPATIENT
Start: 2018-06-14 | End: 2018-06-16 | Stop reason: HOSPADM

## 2018-06-14 RX ORDER — LISINOPRIL 10 MG/1
10 TABLET ORAL DAILY
Status: DISCONTINUED | OUTPATIENT
Start: 2018-06-15 | End: 2018-06-16 | Stop reason: HOSPADM

## 2018-06-14 RX ORDER — DEXTROSE MONOHYDRATE 25 G/50ML
25 INJECTION, SOLUTION INTRAVENOUS
Status: DISCONTINUED | OUTPATIENT
Start: 2018-06-14 | End: 2018-06-16 | Stop reason: HOSPADM

## 2018-06-14 RX ORDER — ASPIRIN 325 MG
325 TABLET ORAL ONCE
Status: DISCONTINUED | OUTPATIENT
Start: 2018-06-14 | End: 2018-06-14

## 2018-06-14 RX ORDER — ZOLPIDEM TARTRATE 5 MG/1
5 TABLET ORAL NIGHTLY
Status: CANCELLED | OUTPATIENT
Start: 2018-06-14

## 2018-06-14 RX ORDER — METOCLOPRAMIDE 10 MG/1
10 TABLET ORAL
Status: DISCONTINUED | OUTPATIENT
Start: 2018-06-15 | End: 2018-06-16 | Stop reason: HOSPADM

## 2018-06-14 RX ORDER — DULOXETIN HYDROCHLORIDE 60 MG/1
60 CAPSULE, DELAYED RELEASE ORAL NIGHTLY
Status: DISCONTINUED | OUTPATIENT
Start: 2018-06-14 | End: 2018-06-16 | Stop reason: HOSPADM

## 2018-06-14 RX ORDER — KETOROLAC TROMETHAMINE 30 MG/ML
30 INJECTION, SOLUTION INTRAMUSCULAR; INTRAVENOUS EVERY 6 HOURS PRN
Status: DISCONTINUED | OUTPATIENT
Start: 2018-06-14 | End: 2018-06-15

## 2018-06-14 RX ORDER — HYDROCODONE BITARTRATE AND ACETAMINOPHEN 5; 325 MG/1; MG/1
1 TABLET ORAL ONCE
Status: COMPLETED | OUTPATIENT
Start: 2018-06-14 | End: 2018-06-14

## 2018-06-14 RX ORDER — SODIUM CHLORIDE 9 MG/ML
75 INJECTION, SOLUTION INTRAVENOUS CONTINUOUS
Status: DISCONTINUED | OUTPATIENT
Start: 2018-06-14 | End: 2018-06-16

## 2018-06-14 RX ORDER — METOPROLOL SUCCINATE 50 MG/1
50 TABLET, EXTENDED RELEASE ORAL DAILY
COMMUNITY

## 2018-06-14 RX ORDER — HEPARIN SODIUM 5000 [USP'U]/ML
5000 INJECTION, SOLUTION INTRAVENOUS; SUBCUTANEOUS EVERY 12 HOURS SCHEDULED
Status: DISCONTINUED | OUTPATIENT
Start: 2018-06-14 | End: 2018-06-16 | Stop reason: HOSPADM

## 2018-06-14 RX ORDER — DULOXETIN HYDROCHLORIDE 60 MG/1
60 CAPSULE, DELAYED RELEASE ORAL NIGHTLY
COMMUNITY

## 2018-06-14 RX ORDER — BUDESONIDE AND FORMOTEROL FUMARATE DIHYDRATE 160; 4.5 UG/1; UG/1
1 AEROSOL RESPIRATORY (INHALATION)
Status: DISCONTINUED | OUTPATIENT
Start: 2018-06-14 | End: 2018-06-16 | Stop reason: HOSPADM

## 2018-06-14 RX ORDER — LORATADINE 10 MG/1
10 TABLET ORAL DAILY
COMMUNITY

## 2018-06-14 RX ORDER — SODIUM CHLORIDE 0.9 % (FLUSH) 0.9 %
1-10 SYRINGE (ML) INJECTION AS NEEDED
Status: DISCONTINUED | OUTPATIENT
Start: 2018-06-14 | End: 2018-06-16 | Stop reason: HOSPADM

## 2018-06-14 RX ORDER — ATORVASTATIN CALCIUM 40 MG/1
40 TABLET, FILM COATED ORAL NIGHTLY
Status: DISCONTINUED | OUTPATIENT
Start: 2018-06-14 | End: 2018-06-16 | Stop reason: HOSPADM

## 2018-06-14 RX ORDER — SODIUM CHLORIDE 0.9 % (FLUSH) 0.9 %
10 SYRINGE (ML) INJECTION AS NEEDED
Status: DISCONTINUED | OUTPATIENT
Start: 2018-06-14 | End: 2018-06-16 | Stop reason: HOSPADM

## 2018-06-14 RX ORDER — PANTOPRAZOLE SODIUM 40 MG/1
40 TABLET, DELAYED RELEASE ORAL DAILY
COMMUNITY

## 2018-06-14 RX ORDER — BUDESONIDE AND FORMOTEROL FUMARATE DIHYDRATE 160; 4.5 UG/1; UG/1
1 AEROSOL RESPIRATORY (INHALATION)
COMMUNITY

## 2018-06-14 RX ADMIN — FAMOTIDINE 20 MG: 20 TABLET, FILM COATED ORAL at 22:51

## 2018-06-14 RX ADMIN — HYDROCODONE BITARTRATE AND ACETAMINOPHEN 1 TABLET: 5; 325 TABLET ORAL at 15:58

## 2018-06-14 RX ADMIN — ASPIRIN 81 MG: 81 TABLET, DELAYED RELEASE ORAL at 18:26

## 2018-06-14 RX ADMIN — DULOXETINE 60 MG: 60 CAPSULE, DELAYED RELEASE ORAL at 22:51

## 2018-06-14 RX ADMIN — ATORVASTATIN CALCIUM 40 MG: 40 TABLET, FILM COATED ORAL at 21:05

## 2018-06-14 RX ADMIN — ROPINIROLE HYDROCHLORIDE 1 MG: 1 TABLET, FILM COATED ORAL at 22:51

## 2018-06-14 RX ADMIN — METFORMIN HYDROCHLORIDE 500 MG: 500 TABLET ORAL at 22:51

## 2018-06-14 RX ADMIN — SODIUM CHLORIDE 75 ML/HR: 9 INJECTION, SOLUTION INTRAVENOUS at 18:04

## 2018-06-14 RX ADMIN — HEPARIN SODIUM 5000 UNITS: 5000 INJECTION, SOLUTION INTRAVENOUS; SUBCUTANEOUS at 21:05

## 2018-06-14 NOTE — ED NOTES
Pt alert and oriented, skin pwd, no respiratory distress. Pt complains of right hip and leg pain, md made aware. Ns noted.      Ameena Deluna RN  06/14/18 0734

## 2018-06-14 NOTE — H&P
Patient Identification:  Name:  Marissa Lau  Age:  57 y.o.  Sex:  female  :  1960  MRN:  [id number]   Visit Number:  [id number]  Primary Care Physician:  GRAHAM Herrera    I have seen the patient in conjunction with Iliana Edge PA-C and I agree with the following statements:    Subjective        Chief complaint: Chest pain     History of presenting illness:  57 y.o. female Marissa Lau with history of stroke, diabetes mellitus, hypertension, and anxiety presented to the ED with reports of chest pain and chronic left-hip pain for the past 3-9 months.  Chest pain is a new substernal left-sided pressure like pain, non-radiating. It is accompanied by shortness of breath and nausea.  She denies associated nausea and diaphoresis.  She denies cough and wheeze.   She reports pain did improve with aspirin.  She was previously admitted to this facility for chest pain and shortness of breath in 2017. She had an echocardiogram performed but she did leave [place] prior to being evaluated by Cardiology.  She has history of COPD and poor IV access.   She recently had left-side chest port removed with right-side port placed per Dr. Stanley.  Given chest pain, she was admitted for further evaluation and treatment.    She denies headache, cough, abdominal pain, nausea, vomiting, dysuria, and hematuria.  She reports left hip pain for several months duration.  She does ask for IV pain medication for her chronic hip pain during my evaluation.  HIp xray unremarkable.      ---------------------------------------------------------------------------------------------------------------------   Review of Systems   Constitutional: Negative for chills, fatigue and fever.   HENT: Negative for congestion and drooling.    Eyes: Negative for pain and discharge.   Respiratory: Negative for cough, chest tightness, shortness of breath and wheezing.    Cardiovascular: Positive for chest pain. Negative for  palpitations and leg swelling.   Gastrointestinal: Negative for abdominal distention, diarrhea and nausea.   Endocrine: Negative for cold intolerance and heat intolerance.   Genitourinary: Negative for difficulty urinating and dysuria.   Musculoskeletal: Positive for arthralgias and myalgias.   Skin: Negative for color change and pallor.   Allergic/Immunologic: Negative for environmental allergies and food allergies.   Neurological: Negative for dizziness and headaches.   Psychiatric/Behavioral: Negative for agitation and confusion.      ---------------------------------------------------------------------------------------------------------------------   Past Medical History:   Diagnosis Date   • Acid reflux    • Anxiety    • Arthritis    • Asthma    • Back pain    • Chest pain     OCURS WHEN HAS A BREATHING  ATTACK   • Chronic headaches    • COPD (chronic obstructive pulmonary disease)    • Depression    • Diabetes mellitus    • Emphysema of lung    • Emphysema of lung    • Heartburn    • History of pneumonia    • Hypertension    • Injury of back    • Migraine    • Pneumonia    • Seizures    • Snores    • Stroke     1996     Past Surgical History:   Procedure Laterality Date   • COLONOSCOPY     • KIDNEY SURGERY      cyst removal   • PORTACATH PLACEMENT      left subclavian   • SINUS SURGERY     • VENOUS ACCESS DEVICE (PORT) INSERTION AND REMOVAL N/A 4/12/2018    Procedure: INSERTION AND REMOVAL OF VENOUS ACCESS DEVICE;  Surgeon: Evaristo Stanley MD;  Location: [facility];  Service: General     Family History   Problem Relation Age of Onset   • COPD Mother    • Diabetes Mother    • Diabetes Father    • Heart disease Father    • Cancer Maternal Uncle    • Heart disease Maternal Uncle    • Cancer Paternal Uncle      Social History     Social History   • Marital status: Single     Social History Main Topics   • Smoking status: Current Every Day Smoker     Packs/day: 1.00     Years: 30.00     Types: Cigarettes   •  Smokeless tobacco: Never Used      Comment: PATIENT STATES SHE HAS CUT BACK SLOWLY   • Alcohol use No   • Drug use: No   • Sexual activity: Defer     Other Topics Concern   • Not on file     ---------------------------------------------------------------------------------------------------------------------   Allergies:  Patient has no known allergies.  ---------------------------------------------------------------------------------------------------------------------     Home medications:    Medications below are reported home medications pulling from within the system; at this time, these medications have not been reconciled unless otherwise specified and are in the verification process in order to verify them as current home medications.    (Not in a hospital admission)      Prior to Admission Medications     Prescriptions Last Dose Informant Patient Reported? Taking?    albuterol (PROVENTIL HFA;VENTOLIN HFA) 108 (90 BASE) MCG/ACT inhaler  Self Yes No    Inhale 2 puffs Every 6 (Six) Hours As Needed for Wheezing or Shortness of Air.    albuterol (PROVENTIL) (2.5 MG/3ML) 0.083% nebulizer solution  Self Yes No    Take 2.5 mg by nebulization Every 6 (Six) Hours As Needed for Wheezing or Shortness of Air.    aspirin 81 MG EC tablet  Self Yes No    Take 81 mg by mouth Daily.    clonazePAM (KlonoPIN) 0.5 MG tablet  MADHU Yes No    Take 0.5 mg by mouth 2 (Two) Times a Day As Needed for Anxiety.    gabapentin (NEURONTIN) 300 MG capsule  MADHU Yes No    Take 300 mg by mouth 3 (Three) Times a Day.    ipratropium-albuterol (COMBIVENT RESPIMAT)  MCG/ACT inhaler  Self Yes No    Inhale 1 puff 4 (Four) Times a Day.    lisinopril (PRINIVIL,ZESTRIL) 10 MG tablet  Self Yes No    Take 10 mg by mouth Daily.    metFORMIN (GLUCOPHAGE) 500 MG tablet  Self Yes No    Take 500 mg by mouth 2 (Two) Times a Day With Meals.    O2 (OXYGEN)   Yes No    Inhale 2 L/min Daily. USES PRN    omeprazole (priLOSEC) 20 MG capsule  Self Yes No     Take 20 mg by mouth Daily.    phenytoin (DILANTIN) 100 MG ER capsule  Self Yes No    Take 300 mg by mouth every night at bedtime.    rOPINIRole (REQUIP) 1 MG tablet  Self Yes No    Take 1 mg by mouth Every Night. Take 1 hour before bedtime.    zolpidem (AMBIEN) 5 MG tablet  MADHU Yes No    Take 5 mg by mouth At Night As Needed for Sleep.        Hospital Scheduled Meds:        ---------------------------------------------------------------------------------------------------------------------     Objective     Vital Signs:  Temp:  [97.4 °F (36.3 °C)] 97.4 °F (36.3 °C)  Heart Rate:  [] 88  Resp:  [18] 18  BP: ()/() 102/75  1    06/14/18  1313   Weight: 80.7 kg (178 lb)     Body mass index is 29.62 kg/m².  ---------------------------------------------------------------------------------------------------------------------       Physical Exam    Physical Exam:  Constitutional:  Well-developed and well-nourished.     HENT:  Head: Normocephalic and atraumatic.  Mouth:  Moist mucous membranes.    Eyes:  Conjunctivae and EOM are normal.  Pupils are equal, round, and reactive to light.  No scleral icterus.  Neck:  Neck supple.  No JVD present.    Cardiovascular:  Normal rate, regular rhythm. Normal s1/s2 with no murmur. Left chest port in place.    Pulmonary/Chest:  No respiratory distress, no wheezes, no crackles, with normal breath sounds and good air movement.  Abdominal:  Soft.  Bowel sounds are present.  No distension and no tenderness.   Musculoskeletal:  No edema, no tenderness, and no deformity.  No red or swollen joints anywhere.    Neurological:  Alert and oriented to person, place, and time.  No cranial nerve deficit.  No tongue deviation.  No facial droop.  No slurred speech.   Skin:  Skin is warm and dry.  No rash noted.  No pallor.   Psychiatric:  Normal mood and affect.  Behavior is normal.  Judgment and thought content normal.   Peripheral vascular:  No edema and strong pulses on all 4  extremities.  ---------------------------------------------------------------------------------------------------------------------  EKG:       I have personally looked at both the EKG and the telemetry strips.  ---------------------------------------------------------------------------------------------------------------------     Results from last 7 days  Lab Units 06/14/18  1407 06/12/18  1040   WBC 10*3/mm3 6.11 6.76   HEMOGLOBIN g/dL 14.3 15.4   HEMATOCRIT % 44.2 44.9   MCV fL 96.1* 91.1   MCHC g/dL 32.4* 34.3   PLATELETS 10*3/mm3 199 229   INR  0.91  --            Results from last 7 days  Lab Units 06/14/18  1407   SODIUM mmol/L 137   POTASSIUM mmol/L 3.8   CHLORIDE mmol/L 111   CO2 mmol/L 22.5*   BUN mg/dL 11   CREATININE mg/dL 0.96   EGFR IF NONAFRICN AM mL/min/1.73 60*   CALCIUM mg/dL 8.7   GLUCOSE mg/dL 94   ALBUMIN g/dL 3.80   BILIRUBIN mg/dL 0.3   ALK PHOS U/L 93   AST (SGOT) U/L 16   ALT (SGPT) U/L 7*   Estimated Creatinine Clearance: 69.5 mL/min (by C-G formula based on SCr of 0.96 mg/dL).  No results found for: AMMONIA    Results from last 7 days  Lab Units 06/14/18  1407   TROPONIN I ng/mL <0.006         Lab Results   Component Value Date    HGBA1C 5.70 06/12/2018     Lab Results   Component Value Date    TSH 1.022 06/12/2018     Lab Results   Component Value Date    PREGTESTUR Negative 12/16/2017     Pain Management Panel     There is no flowsheet data to display.                      Results from last 7 days  Lab Units 06/12/18  1040   CHOLESTEROL mg/dL 253*   TRIGLYCERIDES mg/dL 138   HDL CHOL mg/dL 48*   LDL CHOL mg/dL 177*     ---------------------------------------------------------------------------------------------------------------------  Imaging Results (last 7 days)     Procedure Component Value Units Date/Time    XR Hip With or Without Pelvis 2 - 3 View Left [[id number]] Collected:  06/14/18 1443     Updated:  06/14/18 1446    Narrative:       EXAMINATION: XR HIP W OR WO PELVIS 2-3 VIEW  LEFT-      CLINICAL INDICATION:  pain      TECHNIQUE: X-rays of the left hip in two views. One frontal view of the  pelvis.     COMPARISON: None      FINDINGS: There is mild osteoarthritic change. The alignment of the  osseous structures is anatomic. There is no displaced fracture, evidence  of AVN nor intraosseous destructive lesion. There are no soft tissue  abnormalities. Nonspecific scattered pelvic densities noted.       Impression:       No acute displaced fracture.     This report was finalized on 6/14/2018 2:43 PM by Dr. Win Daly MD.       XR Chest 1 View [[id number]] Collected:  06/14/18 1424     Updated:  06/14/18 1444    Narrative:       EXAMINATION: XR CHEST 1 VW-      CLINICAL INDICATION:     Chest pain protocol     TECHNIQUE:  XR CHEST 1 VW-      COMPARISON: 04/12/2018      FINDINGS:   BIBASILAR ATELECTASIS.PORT-A-CATH WITH TIP IN SVC.     Heart and mediastinum contours are unremarkable.  No pleural effusion.  No pneumothorax.   Bony and soft tissue structures are unremarkable.       Impression:       Stable appearance of the chest.     This report was finalized on 6/14/2018 2:25 PM by Dr. Win Daly MD.             Results for orders placed during the hospital encounter of 12/16/17   Adult Transthoracic Echo Limited W/ Cont if Necessary Per Protocol    Narrative · This is a limited echocardiographic study. No Doppler and color-flow   imaging studies are done  · The left ventricular cavity is mildly dilated.  · Left ventricular systolic function is normal. Estimated EF = 55-60%.  · left ventricular wall segments contract normally.  · Left ventricular diastolic dysfunction (grade I) consistent with   impaired relaxation.  · Normal chamber dimensions  · Normal valve morphology  · There is no evidence of pericardial effusion.                 I have personally reviewed the radiology images and read the final radiology  report.  ---------------------------------------------------------------------------------------------------------------------  Assessment / Plan       Assessment and Plan:    -Chest pain r/o MI:  Mrs. Lau has been admitted for further evaluation and treatment.  Serial cardiac isoenzymes have been ordered. Echocardiogram report from 12/2017 has been reviewed. Daily aspirin has been added. Will consult Cardiology for further evaluation. Troponin is unremarkable thus far. EKG does not reveal acute ischemia.  D-dimer was negative.     -Chronic left hip pain:  Xray unremarkable. Will review home pain medication regimen.     -Diabetes mellitus type II, NID: Will add FSBG ACHS, SSI PRN, and hemoglobin a1c.      -COPD, stable without exacerbation: Will review home medication regimen.     -History of leaving against medical advice.      -DVT prophylaxis: SQ Heparin     Iliana Edge PA-C  06/14/18  4:05 PM  ---------------------------------------------------------------------------------------------------------------------     Patient is much more concerned about her hips which causes her so much discomfort.  Will try Toradol x 1 day.

## 2018-06-14 NOTE — ED NOTES
Pt alert and oriented, skin pwd, no respiratory distress. Pt remains in normal sinus rhythm, pt remains on 2lpm nc and continued to floor. Port remains accessed in right chest wall, 20 gauge 1 inch, patent, flushes easily, no blood return, continued to floor. Pt rates left hip and leg pain 9/10. Denies cp at this time. Pt ready for transport to floor.      Ameena Deluna RN  06/14/18 5217

## 2018-06-14 NOTE — ED PROVIDER NOTES
Subjective   Patient is a 57-year-old lady who presented to the emergency department with multiple complaints:    1: Patient complaining of substernal left-sided dull/pressure-like pain that does not radiate.  Associated with some shortness of breath and nausea.  No vomiting or diaphoresis.  Associated hemoptysis or cough no pneumonia symptoms.  She hasn't noticed anything seems to make the pain worse and she says the pain did get better with aspirin just prior to my coming to examine her.    2: Patient is also complaining of chronic left-sided hip pain.  She's had no new trauma.  Says the pains been constant for the last 4 months.  It's worse with ambulation.  She has no pain with passive range of motion of that left hip.  She's had no fever no chills.         Chest Pain   Associated symptoms: shortness of breath    Associated symptoms: no abdominal pain, no fever and no palpitations        Review of Systems   Constitutional: Negative.  Negative for fever.   HENT: Negative.    Respiratory: Positive for shortness of breath and wheezing. Negative for stridor.    Cardiovascular: Positive for chest pain. Negative for palpitations and leg swelling.   Gastrointestinal: Negative.  Negative for abdominal pain and blood in stool.   Endocrine: Negative.    Genitourinary: Negative.  Negative for dysuria.   Skin: Negative.    Neurological: Negative.  Negative for light-headedness.   Psychiatric/Behavioral: Negative.    All other systems reviewed and are negative.      Past Medical History:   Diagnosis Date   • Acid reflux    • Anxiety    • Arthritis    • Asthma    • Back pain    • Chest pain     OCURS WHEN HAS A BREATHING  ATTACK   • Chronic headaches    • COPD (chronic obstructive pulmonary disease)    • Depression    • Diabetes mellitus    • Emphysema of lung    • Emphysema of lung    • Heartburn    • History of pneumonia    • Hypertension    • Injury of back    • Migraine    • Pneumonia    • Seizures    • Snores    • Stroke      1996       No Known Allergies    Past Surgical History:   Procedure Laterality Date   • COLONOSCOPY     • KIDNEY SURGERY      cyst removal   • PORTACATH PLACEMENT      left subclavian   • SINUS SURGERY     • VENOUS ACCESS DEVICE (PORT) INSERTION AND REMOVAL N/A 4/12/2018    Procedure: INSERTION AND REMOVAL OF VENOUS ACCESS DEVICE;  Surgeon: Evaristo Stanley MD;  Location: [facility];  Service: General       Family History   Problem Relation Age of Onset   • COPD Mother    • Diabetes Mother    • Diabetes Father    • Heart disease Father    • Cancer Maternal Uncle    • Heart disease Maternal Uncle    • Cancer Paternal Uncle        Social History     Social History   • Marital status: Single     Social History Main Topics   • Smoking status: Current Every Day Smoker     Packs/day: 1.00     Years: 30.00     Types: Cigarettes   • Smokeless tobacco: Never Used      Comment: PATIENT STATES SHE HAS CUT BACK SLOWLY   • Alcohol use No   • Drug use: No   • Sexual activity: Defer     Other Topics Concern   • Not on file           Objective   Physical Exam   Constitutional: She is oriented to person, place, and time. She appears well-developed and well-nourished. No distress.   HENT:   Head: Normocephalic and atraumatic.   Right Ear: External ear normal.   Left Ear: External ear normal.   Nose: Nose normal.   Eyes: Conjunctivae and EOM are normal. Pupils are equal, round, and reactive to light.   Neck: Normal range of motion. Neck supple. No JVD present. No tracheal deviation present.   Cardiovascular: Normal rate and regular rhythm.    Pulmonary/Chest: Effort normal. No respiratory distress. She has wheezes.   Abdominal: Soft. Bowel sounds are normal. There is no tenderness.   Musculoskeletal: Normal range of motion. She exhibits no edema or deformity.   Neurological: She is alert and oriented to person, place, and time. No cranial nerve deficit.   Skin: Skin is warm and dry. Capillary refill takes less than 2 seconds. No  rash noted. She is not diaphoretic. No erythema. No pallor.   Psychiatric: She has a normal mood and affect. Her behavior is normal. Thought content normal.   Vitals reviewed.      Procedures           ED Course  ED Course as of Jun 14 1552   Thu Jun 14, 2018   1536 Heart score 6.  First exg done today had poor base line but showed st depression in inferior leads.  No stemi.  Repeat ekg while pt chest pain free shows improvement of those changes and looks similar to prior ekgs.   [NB]   1541 Spoke with dr thomas who agrees to admit.  I specifically asked the pt and she is willing to stay and have a stress test  [NB]      ED Course User Index  [NB] Jaguar Amador MD                  MDM  Number of Diagnoses or Management Options  Chest pain, unspecified type:   Pain of left hip joint:      Amount and/or Complexity of Data Reviewed  Clinical lab tests: ordered and reviewed  Tests in the radiology section of CPT®: ordered and reviewed  Decide to obtain previous medical records or to obtain history from someone other than the patient: yes  Discuss the patient with other providers: yes          Final diagnoses:   Chest pain, unspecified type   Pain of left hip joint            Jaguar Amador MD  06/14/18 3889

## 2018-06-15 LAB
6-ACETYL MORPHINE: NEGATIVE
AMPHET+METHAMPHET UR QL: NEGATIVE
ANION GAP SERPL CALCULATED.3IONS-SCNC: 4.3 MMOL/L (ref 3.6–11.2)
BARBITURATES UR QL SCN: NEGATIVE
BASOPHILS # BLD AUTO: 0.08 10*3/MM3 (ref 0–0.3)
BASOPHILS NFR BLD AUTO: 1.4 % (ref 0–2)
BENZODIAZ UR QL SCN: NEGATIVE
BUN BLD-MCNC: 16 MG/DL (ref 7–21)
BUN/CREAT SERPL: 16.5 (ref 7–25)
BUPRENORPHINE SERPL-MCNC: NEGATIVE NG/ML
CALCIUM SPEC-SCNC: 8.5 MG/DL (ref 7.7–10)
CANNABINOIDS SERPL QL: NEGATIVE
CHLORIDE SERPL-SCNC: 108 MMOL/L (ref 99–112)
CK MB SERPL-CCNC: 1.89 NG/ML (ref 0–5)
CK MB SERPL-CCNC: 2 NG/ML (ref 0–5)
CK MB SERPL-RTO: 2.4 % (ref 0–3)
CK MB SERPL-RTO: 2.5 % (ref 0–3)
CK SERPL-CCNC: 77 U/L (ref 24–173)
CK SERPL-CCNC: 84 U/L (ref 24–173)
CO2 SERPL-SCNC: 25.7 MMOL/L (ref 24.3–31.9)
COCAINE UR QL: NEGATIVE
CREAT BLD-MCNC: 0.97 MG/DL (ref 0.43–1.29)
DEPRECATED RDW RBC AUTO: 44 FL (ref 37–54)
EOSINOPHIL # BLD AUTO: 0.22 10*3/MM3 (ref 0–0.7)
EOSINOPHIL NFR BLD AUTO: 3.9 % (ref 0–5)
ERYTHROCYTE [DISTWIDTH] IN BLOOD BY AUTOMATED COUNT: 13.1 % (ref 11.5–14.5)
GFR SERPL CREATININE-BSD FRML MDRD: 60 ML/MIN/1.73
GLUCOSE BLD-MCNC: 97 MG/DL (ref 70–110)
GLUCOSE BLDC GLUCOMTR-MCNC: 104 MG/DL (ref 70–130)
GLUCOSE BLDC GLUCOMTR-MCNC: 106 MG/DL (ref 70–130)
GLUCOSE BLDC GLUCOMTR-MCNC: 88 MG/DL (ref 70–130)
GLUCOSE BLDC GLUCOMTR-MCNC: 88 MG/DL (ref 70–130)
HCT VFR BLD AUTO: 39.3 % (ref 37–47)
HGB BLD-MCNC: 12.8 G/DL (ref 12–16)
IMM GRANULOCYTES # BLD: 0.01 10*3/MM3 (ref 0–0.03)
IMM GRANULOCYTES NFR BLD: 0.2 % (ref 0–0.5)
LYMPHOCYTES # BLD AUTO: 2.57 10*3/MM3 (ref 1–3)
LYMPHOCYTES NFR BLD AUTO: 45.3 % (ref 21–51)
MCH RBC QN AUTO: 31.3 PG (ref 27–33)
MCHC RBC AUTO-ENTMCNC: 32.6 G/DL (ref 33–37)
MCV RBC AUTO: 96.1 FL (ref 80–94)
METHADONE UR QL SCN: NEGATIVE
MONOCYTES # BLD AUTO: 0.52 10*3/MM3 (ref 0.1–0.9)
MONOCYTES NFR BLD AUTO: 9.2 % (ref 0–10)
NEUTROPHILS # BLD AUTO: 2.27 10*3/MM3 (ref 1.4–6.5)
NEUTROPHILS NFR BLD AUTO: 40 % (ref 30–70)
OPIATES UR QL: NEGATIVE
OSMOLALITY SERPL CALC.SUM OF ELEC: 276.8 MOSM/KG (ref 273–305)
OXYCODONE UR QL SCN: NEGATIVE
PCP UR QL SCN: NEGATIVE
PLATELET # BLD AUTO: 192 10*3/MM3 (ref 130–400)
PMV BLD AUTO: 9.5 FL (ref 6–10)
POTASSIUM BLD-SCNC: 4 MMOL/L (ref 3.5–5.3)
RBC # BLD AUTO: 4.09 10*6/MM3 (ref 4.2–5.4)
SODIUM BLD-SCNC: 138 MMOL/L (ref 135–153)
TROPONIN I SERPL-MCNC: <0.006 NG/ML
TROPONIN I SERPL-MCNC: <0.006 NG/ML
WBC NRBC COR # BLD: 5.67 10*3/MM3 (ref 4.5–12.5)

## 2018-06-15 PROCEDURE — 80307 DRUG TEST PRSMV CHEM ANLYZR: CPT | Performed by: HOSPITALIST

## 2018-06-15 PROCEDURE — 25010000002 HEPARIN (PORCINE) PER 1000 UNITS: Performed by: PHYSICIAN ASSISTANT

## 2018-06-15 PROCEDURE — 94799 UNLISTED PULMONARY SVC/PX: CPT

## 2018-06-15 PROCEDURE — 85025 COMPLETE CBC W/AUTO DIFF WBC: CPT | Performed by: PHYSICIAN ASSISTANT

## 2018-06-15 PROCEDURE — 82550 ASSAY OF CK (CPK): CPT | Performed by: PHYSICIAN ASSISTANT

## 2018-06-15 PROCEDURE — 99204 OFFICE O/P NEW MOD 45 MIN: CPT | Performed by: INTERNAL MEDICINE

## 2018-06-15 PROCEDURE — 96361 HYDRATE IV INFUSION ADD-ON: CPT

## 2018-06-15 PROCEDURE — 82962 GLUCOSE BLOOD TEST: CPT

## 2018-06-15 PROCEDURE — G0378 HOSPITAL OBSERVATION PER HR: HCPCS

## 2018-06-15 PROCEDURE — 96372 THER/PROPH/DIAG INJ SC/IM: CPT

## 2018-06-15 PROCEDURE — 82553 CREATINE MB FRACTION: CPT | Performed by: PHYSICIAN ASSISTANT

## 2018-06-15 PROCEDURE — 84484 ASSAY OF TROPONIN QUANT: CPT | Performed by: PHYSICIAN ASSISTANT

## 2018-06-15 PROCEDURE — 94640 AIRWAY INHALATION TREATMENT: CPT

## 2018-06-15 RX ORDER — NICOTINE 21 MG/24HR
1 PATCH, TRANSDERMAL 24 HOURS TRANSDERMAL EVERY 24 HOURS
Status: DISCONTINUED | OUTPATIENT
Start: 2018-06-15 | End: 2018-06-16 | Stop reason: HOSPADM

## 2018-06-15 RX ORDER — ACETAMINOPHEN 325 MG/1
650 TABLET ORAL EVERY 6 HOURS PRN
Status: DISCONTINUED | OUTPATIENT
Start: 2018-06-15 | End: 2018-06-16 | Stop reason: HOSPADM

## 2018-06-15 RX ADMIN — METOCLOPRAMIDE 10 MG: 10 TABLET ORAL at 05:49

## 2018-06-15 RX ADMIN — HEPARIN SODIUM 5000 UNITS: 5000 INJECTION, SOLUTION INTRAVENOUS; SUBCUTANEOUS at 21:34

## 2018-06-15 RX ADMIN — SODIUM CHLORIDE 75 ML/HR: 9 INJECTION, SOLUTION INTRAVENOUS at 05:50

## 2018-06-15 RX ADMIN — FAMOTIDINE 20 MG: 20 TABLET, FILM COATED ORAL at 09:16

## 2018-06-15 RX ADMIN — METFORMIN HYDROCHLORIDE 500 MG: 500 TABLET ORAL at 21:34

## 2018-06-15 RX ADMIN — ROPINIROLE HYDROCHLORIDE 1 MG: 1 TABLET, FILM COATED ORAL at 21:35

## 2018-06-15 RX ADMIN — METOCLOPRAMIDE 10 MG: 10 TABLET ORAL at 12:32

## 2018-06-15 RX ADMIN — METOCLOPRAMIDE 10 MG: 10 TABLET ORAL at 17:30

## 2018-06-15 RX ADMIN — IPRATROPIUM BROMIDE AND ALBUTEROL SULFATE 3 ML: .5; 3 SOLUTION RESPIRATORY (INHALATION) at 06:38

## 2018-06-15 RX ADMIN — IPRATROPIUM BROMIDE AND ALBUTEROL SULFATE 3 ML: .5; 3 SOLUTION RESPIRATORY (INHALATION) at 12:56

## 2018-06-15 RX ADMIN — NICOTINE 1 PATCH: 14 PATCH, EXTENDED RELEASE TRANSDERMAL at 09:16

## 2018-06-15 RX ADMIN — ATORVASTATIN CALCIUM 40 MG: 40 TABLET, FILM COATED ORAL at 21:34

## 2018-06-15 RX ADMIN — CETIRIZINE HYDROCHLORIDE 10 MG: 10 TABLET ORAL at 09:15

## 2018-06-15 RX ADMIN — FAMOTIDINE 20 MG: 20 TABLET, FILM COATED ORAL at 21:34

## 2018-06-15 RX ADMIN — DULOXETINE 60 MG: 60 CAPSULE, DELAYED RELEASE ORAL at 21:35

## 2018-06-15 RX ADMIN — ASPIRIN 325 MG: 325 TABLET ORAL at 09:16

## 2018-06-15 RX ADMIN — HEPARIN SODIUM 5000 UNITS: 5000 INJECTION, SOLUTION INTRAVENOUS; SUBCUTANEOUS at 09:17

## 2018-06-15 RX ADMIN — BUDESONIDE AND FORMOTEROL FUMARATE DIHYDRATE 1 PUFF: 160; 4.5 AEROSOL RESPIRATORY (INHALATION) at 06:34

## 2018-06-15 RX ADMIN — METOCLOPRAMIDE 10 MG: 10 TABLET ORAL at 09:16

## 2018-06-15 RX ADMIN — PANTOPRAZOLE SODIUM 40 MG: 40 TABLET, DELAYED RELEASE ORAL at 09:15

## 2018-06-15 NOTE — PROGRESS NOTES
[facility] Medicine Services  INPATIENT PROGRESS NOTE    Length of Stay: 0  Date of Admission: 6/14/2018  Primary Care Physician: GRAHAM Herrera    Subjective   Chief Complaint: hip pain  HPI: Patient has no chest pain, nausea or vomiting. She continues to experience hip pain, but analgesics have been helpful    Review of Systems   All pertinent negatives and positives are as above. All other systems have been reviewed and are negative unless otherwise stated.     Objective    Temp:  [97.4 °F (36.3 °C)-97.8 °F (36.6 °C)] 97.4 °F (36.3 °C)  Heart Rate:  [63-82] 82  Resp:  [16-20] 20  BP: ()/(42-58) 82/43  Physical exam:  Constitutional:  Well-developed and well-nourished.  No respiratory distress.      HENT:  Head:  Normocephalic and atraumatic.  Mouth:  Moist mucous membranes.    Eyes:  Conjunctivae and EOM are normal.  Pupils are equal, round.  No scleral icterus.    Neck:  Neck supple.  No JVD present.    Cardiovascular:  Normal rate, regular rhythm and normal heart sounds with no murmur.  Pulmonary/Chest:  No respiratory distress, no wheezes, no crackles, with normal breath sounds and good air movement.  Abdominal:  Soft.  Bowel sounds are normal.  No distension and no tenderness.   Musculoskeletal:  No edema, no tenderness, and no deformity.    Neurological:  Alert and oriented to person, place, and time.  No cranial nerve deficit.  No tongue deviation.    Skin:  Skin is warm and dry. No rash noted. No pallor.   Peripheral vascular:  no clubbing, no cyanosis, no edema.    Results Review:  I have reviewed the labs, radiology results, and diagnostic studies.    Laboratory Data:   Lab Results (last 24 hours)     Procedure Component Value Units Date/Time    POC Glucose Once [[id number]]  (Normal) Collected:  06/15/18 1609    Specimen:  Blood Updated:  06/15/18 1631     Glucose 104 mg/dL     Narrative:       Meter: [id number] : [id number] bharati craig    POC Glucose  Once [[id number]]  (Normal) Collected:  06/15/18 1131    Specimen:  Blood Updated:  06/15/18 1153     Glucose 88 mg/dL     Narrative:       Meter: [id number] : [id number] bharati craig    POC Glucose Once [[id number]]  (Normal) Collected:  06/15/18 0716    Specimen:  Blood Updated:  06/15/18 0723     Glucose 88 mg/dL     Narrative:       Meter: [id number] : [id number] bharati craig    CK Total & CKMB [[id number]]  (Normal) Collected:  06/15/18 0423    Specimen:  Blood Updated:  06/15/18 0500     CKMB 1.89 ng/mL      Creatine Kinase 77 U/L     Troponin [[id number]]  (Normal) Collected:  06/15/18 0423    Specimen:  Blood Updated:  06/15/18 0500     Troponin I <0.006 ng/mL     Narrative:       Ultra Troponin I Reference Range:         <=0.039 ng/mL: Negative    0.04-0.779 ng/mL: Indeterminate Range. Suspicious of MI.  Clinical correlation required.       >=0.78  ng/mL: Consistent with myocardial injury.  Clinical correlation required.    CK-MB Index [[id number]]  (Normal) Collected:  06/15/18 0423    Specimen:  Blood Updated:  06/15/18 0500     CK-MB Index 2.5 %     CBC & Differential [[id number]] Collected:  06/15/18 0423    Specimen:  Blood Updated:  06/15/18 0452    Narrative:       The following orders were created for panel order CBC & Differential.  Procedure                               Abnormality         Status                     ---------                               -----------         ------                     CBC Auto Differential[[id number]]        Abnormal            Final result                 Please view results for these tests on the individual orders.    CBC Auto Differential [[id number]]  (Abnormal) Collected:  06/15/18 0423    Specimen:  Blood Updated:  06/15/18 0452     WBC 5.67 10*3/mm3      RBC 4.09 (L) 10*6/mm3      Hemoglobin 12.8 g/dL      Hematocrit 39.3 %      MCV 96.1 (H) fL      MCH 31.3 pg      MCHC 32.6 (L) g/dL      RDW 13.1 %      RDW-SD 44.0 fl      MPV 9.5 fL      Platelets  192 10*3/mm3      Neutrophil % 40.0 %      Lymphocyte % 45.3 %      Monocyte % 9.2 %      Eosinophil % 3.9 %      Basophil % 1.4 %      Immature Grans % 0.2 %      Neutrophils, Absolute 2.27 10*3/mm3      Lymphocytes, Absolute 2.57 10*3/mm3      Monocytes, Absolute 0.52 10*3/mm3      Eosinophils, Absolute 0.22 10*3/mm3      Basophils, Absolute 0.08 10*3/mm3      Immature Grans, Absolute 0.01 10*3/mm3     Basic Metabolic Panel [[id number]]  (Abnormal) Collected:  06/14/18 2332    Specimen:  Blood Updated:  06/15/18 0029     Glucose 97 mg/dL      BUN 16 mg/dL      Creatinine 0.97 mg/dL      Sodium 138 mmol/L      Potassium 4.0 mmol/L      Chloride 108 mmol/L      CO2 25.7 mmol/L      Calcium 8.5 mg/dL      eGFR Non African Amer 60 (L) mL/min/1.73      BUN/Creatinine Ratio 16.5     Anion Gap 4.3 mmol/L     Narrative:       GFR Normal >60  Chronic Kidney Disease <60  Kidney Failure <15    Osmolality, Calculated [[id number]]  (Normal) Collected:  06/14/18 2332    Specimen:  Blood Updated:  06/15/18 0029     Osmolality Calc 276.8 mOsm/kg     CK Total & CKMB [[id number]]  (Normal) Collected:  06/14/18 2332    Specimen:  Blood Updated:  06/15/18 0015     CKMB 2.00 ng/mL      Creatine Kinase 84 U/L     Troponin [[id number]]  (Normal) Collected:  06/14/18 2332    Specimen:  Blood Updated:  06/15/18 0015     Troponin I <0.006 ng/mL     Narrative:       Ultra Troponin I Reference Range:         <=0.039 ng/mL: Negative    0.04-0.779 ng/mL: Indeterminate Range. Suspicious of MI.  Clinical correlation required.       >=0.78  ng/mL: Consistent with myocardial injury.  Clinical correlation required.    CK-MB Index [[id number]]  (Normal) Collected:  06/14/18 2332    Specimen:  Blood Updated:  06/15/18 0015     CK-MB Index 2.4 %     Troponin [[id number]]  (Normal) Collected:  06/14/18 1729    Specimen:  Blood Updated:  06/14/18 1817     Troponin I <0.006 ng/mL     Narrative:       Ultra Troponin I Reference Range:         <=0.039 ng/mL:  Negative    0.04-0.779 ng/mL: Indeterminate Range. Suspicious of MI.  Clinical correlation required.       >=0.78  ng/mL: Consistent with myocardial injury.  Clinical correlation required.    CK Total & CKMB [[id number]]  (Normal) Collected:  06/14/18 1729    Specimen:  Blood Updated:  06/14/18 1817     CKMB 1.40 ng/mL      Creatine Kinase 88 U/L     CK-MB Index [[id number]]  (Normal) Collected:  06/14/18 1729    Specimen:  Blood Updated:  06/14/18 1817     CK-MB Index 1.6 %     POC Glucose Once [[id number]]  (Normal) Collected:  06/14/18 1806    Specimen:  Blood Updated:  06/14/18 1812     Glucose 107 mg/dL     Narrative:       Meter: [id number] : [id number] sunil dia          Culture Data:   No results found for: BLOODCX  No results found for: URINECX  No results found for: RESPCX  No results found for: WOUNDCX  No results found for: STOOLCX  No components found for: BODYFLD    Radiology Data:   Imaging Results (last 24 hours)     ** No results found for the last 24 hours. **          I have reviewed the patient current medications.     Assessment/Plan     Assessment and Plan:   -Chest pain r/o MI: chest pain resolved; troponin normal    -Chronic left hip pain:   -Diabetes mellitus type II,   -COPD, stable without exacerbation:     The patient The patient would need to undergo a stress test.  For some reason the stress test was scheduled for tomorrow.  It is unclear if the test can be done on Saturdays. Nevertheless we will keep her NPO at [facility].  Administer analgesics for hip pain. Monitor blood glucose and administer insulin therapy as needed  AM labs    Discharge Planning: I expect patient to be discharged  in 1-2 days.    Evaristo Rivera MD   06/15/18   5:07 PM

## 2018-06-15 NOTE — CONSULTS
Patient Name: Marissa Lau  Age/Sex: 57 y.o. female  : 1960  MRN: [id number]    Date of Hospital Visit: 2018  Encounter Provider: Angel Orozco MD  Referring Provider: Jaguar Johnson*         Subjective:       Chief Complaint: Atypical chest pain    History of Present Illness:  Marissa Lau is a 57 y.o. female called the ambulance after several minutes of left-sided chest pain.  The major reason she came in is for the left sided hip pain and knee pain.  This has been going on for several months.  Upon arrival to the emergency room chest pain disappeared.  2 sets of enzymes and EKG were normal.  Patient is known to have hypertension, diabetes and hyperlipidemia.  She smokes half a pack a day.  She is a nondrinker.  At age 38, admission had right sided hemispheric stroke.  It took her one year for rehabilitation .  Mother  at age 56 of myocardial infarction.      Past Medical History:  Past Medical History:   Diagnosis Date   • Acid reflux    • Anxiety    • Arthritis    • Asthma    • Back pain    • Chest pain     OCURS WHEN HAS A BREATHING  ATTACK   • Chronic headaches    • COPD (chronic obstructive pulmonary disease)    • Depression    • Diabetes mellitus    • Emphysema of lung    • Emphysema of lung    • Heartburn    • History of pneumonia    • Hypertension    • Injury of back    • Migraine    • Pneumonia    • Seizures    • Snores    • Stroke            Past Surgical History:   Procedure Laterality Date   • COLONOSCOPY     • KIDNEY SURGERY      cyst removal   • PORTACATH PLACEMENT      left subclavian   • SINUS SURGERY     • VENOUS ACCESS DEVICE (PORT) INSERTION AND REMOVAL N/A 2018    Procedure: INSERTION AND REMOVAL OF VENOUS ACCESS DEVICE;  Surgeon: Evaristo Stanley MD;  Location: [facility];  Service: General       Home Medications:    Prescriptions Prior to Admission   Medication Sig Dispense Refill Last Dose   • aspirin 325 MG tablet Take 325 mg by mouth Daily.    6/13/2018 at Unknown time   • budesonide-formoterol (SYMBICORT) 160-4.5 MCG/ACT inhaler Inhale 1 puff 2 (Two) Times a Day.   6/13/2018 at Unknown time   • clonazePAM (KlonoPIN) 0.5 MG tablet Take 0.5 mg by mouth 2 (Two) Times a Day.   6/13/2018 at Unknown time   • DULoxetine (CYMBALTA) 60 MG capsule Take 60 mg by mouth Every Night.   6/13/2018 at Unknown time   • gabapentin (NEURONTIN) 300 MG capsule Take 300 mg by mouth 3 (Three) Times a Day.   6/13/2018 at Unknown time   • ipratropium-albuterol (COMBIVENT RESPIMAT)  MCG/ACT inhaler Inhale 1 puff 4 (Four) Times a Day.   6/13/2018 at Unknown time   • lisinopril (PRINIVIL,ZESTRIL) 10 MG tablet Take 10 mg by mouth Daily.   6/13/2018 at Unknown time   • loratadine (CLARITIN) 10 MG tablet Take 10 mg by mouth Daily.   6/13/2018 at Unknown time   • metFORMIN (GLUCOPHAGE) 500 MG tablet Take 500 mg by mouth Every Night.   6/13/2018 at Unknown time   • metoclopramide (REGLAN) 10 MG tablet Take 10 mg by mouth 3 (Three) Times a Day Before Meals.   6/13/2018 at Unknown time   • metoprolol succinate XL (TOPROL-XL) 50 MG 24 hr tablet Take 50 mg by mouth Daily.   6/13/2018 at Unknown time   • pantoprazole (PROTONIX) 40 MG EC tablet Take 40 mg by mouth Daily.   6/13/2018 at Unknown time   • rOPINIRole (REQUIP) 1 MG tablet Take 1 mg by mouth Every Night. Take 1 hour before bedtime.   6/13/2018 at Unknown time   • zolpidem (AMBIEN) 5 MG tablet Take 5 mg by mouth Every Night.   6/13/2018 at Unknown time       Allergies:  Allergies   Allergen Reactions   • Codeine Hives   • Toradol [Ketorolac Tromethamine] Hives       Past Social History:  Social History     Social History   • Marital status: Single     Social History Main Topics   • Smoking status: Current Every Day Smoker     Packs/day: 1.00     Years: 30.00     Types: Cigarettes   • Smokeless tobacco: Never Used      Comment: PATIENT STATES SHE HAS CUT BACK SLOWLY   • Alcohol use Yes      Comment: a few times per month she has  mixed drinks    • Drug use: No   • Sexual activity: Defer     Other Topics Concern   • Not on file       Past Family History:  Family History   Problem Relation Age of Onset   • COPD Mother    • Diabetes Mother    • Diabetes Father    • Heart disease Father    • Cancer Maternal Uncle    • Heart disease Maternal Uncle    • Cancer Paternal Uncle        Review of Systems:   Constitution: No chills, no rigors, no unexplained weight loss or weight gain  Eyes:  No diplopia, no blurred vision, no loss of vision, conjunctiva is pink and sclera is anicteric  ENT:  No tinnitus, no otorrhea, no epistaxis, no sore throat   Respiratory: COPD  Cardiovascular: see HPI  Gastrointestinal: No nausea, no vomiting, no hematemesis, no diarrhea or constipation, no melena  Genitourinary: No frequency of dysuria no hematuria  Integument: No pruritis and  no skin rash  Hematologic / Lymphatic: No excessive bleeding, easy bruising, fatigue, lymphadenopathy and petechiae  Musculoskeletal: Chronic left hip and left knee pain  Neurological: History of right hemispheric stroke  Endocrine: No frequent urination and nocturia, temperature intolerance, weight gain, unintended and weight loss, unintended      Objective:     Objective:  Vital Signs (last 24 hours)       06/14 0700  -  06/15 0659 06/15 0700  -  06/15 1453   Most Recent    Temp (°F) 97.4 -  97.8      97.4     97.4 (36.3)    Heart Rate 63 -  113    72 -  75     72    Resp 16 -  20    18 -  20     18    BP (!)84/53 -  149/92      (!)71/42     (!) 71/42  Comment: ANDRIY steinberg    SpO2 (%) 93 -  100    94 -  95     95          Body mass index is 29.65 kg/m².  Weight change:           PHYSICAL EXAM:    General Appearance:    Alert, cooperative, in no acute distress   Head:    Normocephalic, without obvious abnormality, atraumatic       Neck:   Supple, trachea midline, no JVD   Lungs:     Clear to auscultation,respirations regular, even and                  unlabored    Heart:    Regular  rhythm and normal rate, normal S1 and S2, no            murmur, no gallop, no rub, no click   Chest Wall:    No abnormalities observed   Abdomen:     Normal bowel sounds,  non-distended   Extremities:   Moves all extremities well, no edema, no cyanosis, no             redness   Pulses:   Pulses palpable and equal bilaterally   Skin:   No bleeding, bruising or rash   Neurologic:   A & O x 3              Lab Review:       Results from last 7 days  Lab Units 06/14/18  2332 06/14/18  1407   SODIUM mmol/L 138 137   POTASSIUM mmol/L 4.0 3.8   CHLORIDE mmol/L 108 111   CO2 mmol/L 25.7 22.5*   BUN mg/dL 16 11   CREATININE mg/dL 0.97 0.96   CALCIUM mg/dL 8.5 8.7   BILIRUBIN mg/dL  --  0.3   ALK PHOS U/L  --  93   ALT (SGPT) U/L  --  7*   AST (SGOT) U/L  --  16   GLUCOSE mg/dL 97 94       Results from last 7 days  Lab Units 06/15/18  0423 06/14/18  2332 06/14/18  1729   CK TOTAL U/L 77 84 88   TROPONIN I ng/mL <0.006 <0.006 <0.006   CK MB INDEX % 2.5 2.4 1.6       Results from last 7 days  Lab Units 06/14/18  1407   BNP pg/mL 7.0       Results from last 7 days  Lab Units 06/15/18  0423   WBC 10*3/mm3 5.67   HEMOGLOBIN g/dL 12.8   HEMATOCRIT % 39.3   PLATELETS 10*3/mm3 192       Results from last 7 days  Lab Units 06/14/18  1407   INR  0.91           Results from last 7 days  Lab Units 06/12/18  1040   CHOLESTEROL mg/dL 253*   TRIGLYCERIDES mg/dL 138   HDL CHOL mg/dL 48*   LDL CHOL mg/dL 177*       Results from last 7 days  Lab Units 06/12/18  1040   TSH mIU/mL 1.022       EKG:   ECG/EMG Results (last 24 hours)     Procedure Component Value Units Date/Time    ECG 12 Lead [[id number]] Collected:  06/14/18 1309     Updated:  06/15/18 1030    Narrative:       Test Reason : chest pain  Blood Pressure : **/** mmHG  Vent. Rate : 106 BPM     Atrial Rate : 106 BPM     P-R Int : 150 ms          QRS Dur : 080 ms      QT Int : 330 ms       P-R-T Axes : 077 -49 047 degrees     QTc Int : 438 ms    Sinus tachycardia  Possible Left atrial  enlargement  Left axis deviation  Abnormal ECG  When compared with ECG of 20-DEC-2017 19:14,  No significant change was found  Confirmed by Itzel Kumar ([id number]) on 6/15/2018 10:30:37 AM    Referred By:  JUAN MANUEL           Confirmed By:Itzel Kumar    ECG 12 Lead [[id number]] Collected:  06/14/18 1526     Updated:  06/15/18 1032    Narrative:       Test Reason : Chest Pain  Blood Pressure : **/** mmHG  Vent. Rate : 083 BPM     Atrial Rate : 083 BPM     P-R Int : 156 ms          QRS Dur : 080 ms      QT Int : 382 ms       P-R-T Axes : 074 -25 047 degrees     QTc Int : 448 ms    Normal sinus rhythm  Normal ECG  When compared with ECG of 14-JUN-2018 13:09, (Unconfirmed)  No significant change was found  Confirmed by Itzel Kumar ([id number]) on 6/15/2018 10:32:42 AM    Referred By:  WILBUR           Confirmed By:Itzel Kumar          Imaging:  Imaging Results (last 24 hours)     ** No results found for the last 24 hours. **          I personally viewed and interpreted the patient's EKG/Telemetry data.    Assessment:     Active Problems:    Chest pain, Atypical in nature, risk factor with hypertension, diabetes and hyperlipidemia.  Patient had right hemispheric stroke at age 38.  We'll proceed with IV Lexiscan nuclear stress test.          Plan:       Nuclear stress test      Angel Orozco MD  06/15/18  2:53 PM

## 2018-06-15 NOTE — PLAN OF CARE
Problem: Patient Care Overview  Goal: Plan of Care Review  Outcome: Ongoing (interventions implemented as appropriate)   06/14/18 1945 06/14/18 8391   Plan of Care Review   Progress --  no change   Coping/Psychosocial   Plan of Care Reviewed With patient --      Goal: Individualization and Mutuality  Outcome: Ongoing (interventions implemented as appropriate)      Problem: Pain, Acute (Adult)  Goal: Identify Related Risk Factors and Signs and Symptoms  Outcome: Ongoing (interventions implemented as appropriate)    Goal: Acceptable Pain Control/Comfort Level  Outcome: Ongoing (interventions implemented as appropriate)

## 2018-06-15 NOTE — PLAN OF CARE
Problem: Patient Care Overview  Goal: Plan of Care Review  Outcome: Ongoing (interventions implemented as appropriate)    Goal: Individualization and Mutuality  Outcome: Ongoing (interventions implemented as appropriate)    Goal: Discharge Needs Assessment  Outcome: Ongoing (interventions implemented as appropriate)    Goal: Interprofessional Rounds/Family Conf  Outcome: Ongoing (interventions implemented as appropriate)      Problem: Pain, Acute (Adult)  Goal: Identify Related Risk Factors and Signs and Symptoms  Outcome: Ongoing (interventions implemented as appropriate)    Goal: Acceptable Pain Control/Comfort Level  Outcome: Ongoing (interventions implemented as appropriate)

## 2018-06-16 ENCOUNTER — APPOINTMENT (OUTPATIENT)
Dept: NUCLEAR MEDICINE | Facility: HOSPITAL | Age: 58
End: 2018-06-16

## 2018-06-16 ENCOUNTER — APPOINTMENT (OUTPATIENT)
Dept: CARDIOLOGY | Facility: HOSPITAL | Age: 58
End: 2018-06-16

## 2018-06-16 VITALS
SYSTOLIC BLOOD PRESSURE: 131 MMHG | TEMPERATURE: 98 F | BODY MASS INDEX: 29.85 KG/M2 | HEART RATE: 104 BPM | DIASTOLIC BLOOD PRESSURE: 84 MMHG | RESPIRATION RATE: 18 BRPM | OXYGEN SATURATION: 95 % | HEIGHT: 65 IN | WEIGHT: 179.2 LBS

## 2018-06-16 PROBLEM — R07.9 CHEST PAIN: Status: RESOLVED | Noted: 2017-12-16 | Resolved: 2018-06-16

## 2018-06-16 LAB
ANION GAP SERPL CALCULATED.3IONS-SCNC: 1.2 MMOL/L (ref 3.6–11.2)
BASOPHILS # BLD AUTO: 0.05 10*3/MM3 (ref 0–0.3)
BASOPHILS NFR BLD AUTO: 0.9 % (ref 0–2)
BH CV NUCLEAR PRIOR STUDY: 3
BH CV STRESS BP STAGE 1: NORMAL
BH CV STRESS BP STAGE 2: NORMAL
BH CV STRESS COMMENTS STAGE 1: NORMAL
BH CV STRESS COMMENTS STAGE 2: NORMAL
BH CV STRESS DOSE REGADENOSON STAGE 1: 0.4
BH CV STRESS DURATION MIN STAGE 1: 0
BH CV STRESS DURATION MIN STAGE 2: 4
BH CV STRESS DURATION SEC STAGE 1: 10
BH CV STRESS DURATION SEC STAGE 2: 0
BH CV STRESS HR STAGE 1: 99
BH CV STRESS HR STAGE 2: 85
BH CV STRESS PROTOCOL 1: NORMAL
BH CV STRESS RECOVERY BP: NORMAL MMHG
BH CV STRESS RECOVERY HR: 85 BPM
BH CV STRESS STAGE 1: 1
BH CV STRESS STAGE 2: 2
BUN BLD-MCNC: 10 MG/DL (ref 7–21)
BUN/CREAT SERPL: 12.7 (ref 7–25)
CALCIUM SPEC-SCNC: 7.9 MG/DL (ref 7.7–10)
CHLORIDE SERPL-SCNC: 115 MMOL/L (ref 99–112)
CO2 SERPL-SCNC: 22.8 MMOL/L (ref 24.3–31.9)
CREAT BLD-MCNC: 0.79 MG/DL (ref 0.43–1.29)
DEPRECATED RDW RBC AUTO: 43.8 FL (ref 37–54)
EOSINOPHIL # BLD AUTO: 0.16 10*3/MM3 (ref 0–0.7)
EOSINOPHIL NFR BLD AUTO: 2.9 % (ref 0–5)
ERYTHROCYTE [DISTWIDTH] IN BLOOD BY AUTOMATED COUNT: 13 % (ref 11.5–14.5)
GFR SERPL CREATININE-BSD FRML MDRD: 76 ML/MIN/1.73
GLUCOSE BLD-MCNC: 78 MG/DL (ref 70–110)
GLUCOSE BLDC GLUCOMTR-MCNC: 77 MG/DL (ref 70–130)
GLUCOSE BLDC GLUCOMTR-MCNC: 89 MG/DL (ref 70–130)
GLUCOSE BLDC GLUCOMTR-MCNC: 91 MG/DL (ref 70–130)
HCT VFR BLD AUTO: 39.1 % (ref 37–47)
HGB BLD-MCNC: 12.7 G/DL (ref 12–16)
IMM GRANULOCYTES # BLD: 0.01 10*3/MM3 (ref 0–0.03)
IMM GRANULOCYTES NFR BLD: 0.2 % (ref 0–0.5)
LV EF NUC BP: 69 %
LYMPHOCYTES # BLD AUTO: 2.18 10*3/MM3 (ref 1–3)
LYMPHOCYTES NFR BLD AUTO: 40.1 % (ref 21–51)
MAXIMAL PREDICTED HEART RATE: 165 BPM
MCH RBC QN AUTO: 30.8 PG (ref 27–33)
MCHC RBC AUTO-ENTMCNC: 32.5 G/DL (ref 33–37)
MCV RBC AUTO: 94.9 FL (ref 80–94)
MONOCYTES # BLD AUTO: 0.4 10*3/MM3 (ref 0.1–0.9)
MONOCYTES NFR BLD AUTO: 7.4 % (ref 0–10)
NEUTROPHILS # BLD AUTO: 2.64 10*3/MM3 (ref 1.4–6.5)
NEUTROPHILS NFR BLD AUTO: 48.5 % (ref 30–70)
OSMOLALITY SERPL CALC.SUM OF ELEC: 275.4 MOSM/KG (ref 273–305)
PERCENT MAX PREDICTED HR: 60 %
PLATELET # BLD AUTO: 196 10*3/MM3 (ref 130–400)
PMV BLD AUTO: 10.9 FL (ref 6–10)
POTASSIUM BLD-SCNC: 4.8 MMOL/L (ref 3.5–5.3)
RBC # BLD AUTO: 4.12 10*6/MM3 (ref 4.2–5.4)
SODIUM BLD-SCNC: 139 MMOL/L (ref 135–153)
STRESS BASELINE BP: NORMAL MMHG
STRESS BASELINE HR: 75 BPM
STRESS PERCENT HR: 71 %
STRESS POST PEAK BP: NORMAL MMHG
STRESS POST PEAK HR: 99 BPM
STRESS TARGET HR: 140 BPM
WBC NRBC COR # BLD: 5.44 10*3/MM3 (ref 4.5–12.5)

## 2018-06-16 PROCEDURE — 85025 COMPLETE CBC W/AUTO DIFF WBC: CPT | Performed by: PHYSICIAN ASSISTANT

## 2018-06-16 PROCEDURE — 94799 UNLISTED PULMONARY SVC/PX: CPT

## 2018-06-16 PROCEDURE — 78452 HT MUSCLE IMAGE SPECT MULT: CPT

## 2018-06-16 PROCEDURE — 80048 BASIC METABOLIC PNL TOTAL CA: CPT | Performed by: PHYSICIAN ASSISTANT

## 2018-06-16 PROCEDURE — 25010000002 REGADENOSON 0.4 MG/5ML SOLUTION: Performed by: INTERNAL MEDICINE

## 2018-06-16 PROCEDURE — 96361 HYDRATE IV INFUSION ADD-ON: CPT

## 2018-06-16 PROCEDURE — G0378 HOSPITAL OBSERVATION PER HR: HCPCS

## 2018-06-16 PROCEDURE — 93017 CV STRESS TEST TRACING ONLY: CPT

## 2018-06-16 PROCEDURE — A9500 TC99M SESTAMIBI: HCPCS | Performed by: INTERNAL MEDICINE

## 2018-06-16 PROCEDURE — 0 TECHNETIUM SESTAMIBI: Performed by: INTERNAL MEDICINE

## 2018-06-16 PROCEDURE — 99214 OFFICE O/P EST MOD 30 MIN: CPT | Performed by: INTERNAL MEDICINE

## 2018-06-16 PROCEDURE — 82962 GLUCOSE BLOOD TEST: CPT

## 2018-06-16 RX ORDER — CLONAZEPAM 0.5 MG/1
0.5 TABLET ORAL 2 TIMES DAILY PRN
Status: DISCONTINUED | OUTPATIENT
Start: 2018-06-16 | End: 2018-06-16 | Stop reason: HOSPADM

## 2018-06-16 RX ORDER — ZOLPIDEM TARTRATE 5 MG/1
5 TABLET ORAL NIGHTLY PRN
Status: DISCONTINUED | OUTPATIENT
Start: 2018-06-16 | End: 2018-06-16 | Stop reason: HOSPADM

## 2018-06-16 RX ORDER — GABAPENTIN 300 MG/1
300 CAPSULE ORAL 3 TIMES DAILY
Status: DISCONTINUED | OUTPATIENT
Start: 2018-06-16 | End: 2018-06-16 | Stop reason: HOSPADM

## 2018-06-16 RX ADMIN — METOCLOPRAMIDE 10 MG: 10 TABLET ORAL at 16:20

## 2018-06-16 RX ADMIN — REGADENOSON 0.4 MG: 0.08 INJECTION, SOLUTION INTRAVENOUS at 11:05

## 2018-06-16 RX ADMIN — LISINOPRIL 10 MG: 10 TABLET ORAL at 13:03

## 2018-06-16 RX ADMIN — CETIRIZINE HYDROCHLORIDE 10 MG: 10 TABLET ORAL at 13:03

## 2018-06-16 RX ADMIN — TECHNETIUM TC 99M SESTAMIBI 1 DOSE: 1 INJECTION INTRAVENOUS at 11:05

## 2018-06-16 RX ADMIN — METOCLOPRAMIDE 10 MG: 10 TABLET ORAL at 13:03

## 2018-06-16 RX ADMIN — TECHNETIUM TC 99M SESTAMIBI 1 DOSE: 1 INJECTION INTRAVENOUS at 07:25

## 2018-06-16 RX ADMIN — SODIUM CHLORIDE 75 ML/HR: 9 INJECTION, SOLUTION INTRAVENOUS at 04:21

## 2018-06-16 RX ADMIN — NICOTINE 1 PATCH: 14 PATCH, EXTENDED RELEASE TRANSDERMAL at 13:01

## 2018-06-16 RX ADMIN — FAMOTIDINE 20 MG: 20 TABLET, FILM COATED ORAL at 13:03

## 2018-06-16 RX ADMIN — METOCLOPRAMIDE 10 MG: 10 TABLET ORAL at 17:30

## 2018-06-16 RX ADMIN — METOPROLOL SUCCINATE 50 MG: 50 TABLET, FILM COATED, EXTENDED RELEASE ORAL at 13:03

## 2018-06-16 RX ADMIN — ASPIRIN 325 MG: 325 TABLET ORAL at 13:02

## 2018-06-16 RX ADMIN — GABAPENTIN 300 MG: 300 CAPSULE ORAL at 16:58

## 2018-06-16 RX ADMIN — CLONAZEPAM 0.5 MG: 0.5 TABLET ORAL at 16:58

## 2018-06-16 RX ADMIN — SODIUM CHLORIDE 75 ML/HR: 9 INJECTION, SOLUTION INTRAVENOUS at 17:40

## 2018-06-16 RX ADMIN — PANTOPRAZOLE SODIUM 40 MG: 40 TABLET, DELAYED RELEASE ORAL at 09:00

## 2018-06-16 RX ADMIN — SODIUM CHLORIDE 75 ML/HR: 9 INJECTION, SOLUTION INTRAVENOUS at 03:21

## 2018-06-16 NOTE — PLAN OF CARE
Problem: Patient Care Overview  Goal: Individualization and Mutuality  Outcome: Ongoing (interventions implemented as appropriate)    Goal: Discharge Needs Assessment  Outcome: Ongoing (interventions implemented as appropriate)    Goal: Interprofessional Rounds/Family Conf  Outcome: Ongoing (interventions implemented as appropriate)      Problem: Pain, Acute (Adult)  Goal: Identify Related Risk Factors and Signs and Symptoms  Outcome: Ongoing (interventions implemented as appropriate)    Goal: Acceptable Pain Control/Comfort Level  Outcome: Ongoing (interventions implemented as appropriate)

## 2018-06-16 NOTE — DISCHARGE SUMMARY
[facility] Medicine Services  DISCHARGE SUMMARY       Date of Admission: 6/14/2018  Date of Discharge:  6/16/2018  Primary Care Physician: GRAHAM Herrera    Presenting Problem/History of Present Illness:  Chest pain, unspecified type [R07.9]    57 y.o. female Marissa Lau with history of stroke, diabetes mellitus, hypertension, and anxiety presented to the ED with reports of chest pain and chronic left-hip pain for the past 3-9 months.  Chest pain is a new substernal left-sided pressure like pain, non-radiating. It is accompanied by shortness of breath and nausea.  She denies associated nausea and diaphoresis.  She denies cough and wheeze.   She reports pain did improve with aspirin.  She was previously admitted to this facility for chest pain and shortness of breath in December of 2017. She had an echocardiogram performed but she did leave AMA prior to being evaluated by Cardiology.  She has history of COPD and poor IV access.   She recently had left-side chest port removed with right-side port placed per Dr. Stanley.  Given chest pain, she was admitted for further evaluation and treatment.    She denies headache, cough, abdominal pain, nausea, vomiting, dysuria, and hematuria.  She reports left hip pain for several months duration.  She does ask for IV pain medication for her chronic hip pain during my evaluation.  HIp xray unremarkable.    Final Discharge Diagnoses:  Noncardiac chest pain      Consults:   Consults     Date and Time Order Name Status Description    6/14/2018 1721 Inpatient Cardiology Consult                      Chief Complaint on Day of Discharge: none    Hospital Course:  The patient was placed on the ACS protocol. Serial cardiac enzymes were negative. Stress test was also essentially negative.  She still uses tobacco products. Patient was counseled to quit. Blood glucose was monitored and controlled.    She   Was also given analgesics for chronic hip  "pains.     Condition on Discharge:  improved    Physical Exam on Discharge:  /84 (BP Location: Left arm, Patient Position: Sitting)   Pulse 104   Temp 98 °F (36.7 °C) (Oral)   Resp 18   Ht 165.1 cm (65\")   Wt 81.3 kg (179 lb 3.2 oz)   SpO2 95%   BMI 29.82 kg/m²   Physical Exam  Constitutional:  Well-developed and well-nourished.  No respiratory distress.      HENT:  Head:  Normocephalic and atraumatic.  Mouth:  Moist mucous membranes.    Eyes:  Conjunctivae and EOM are normal.  Pupils are equal, round.  No scleral icterus.    Neck:  Neck supple.  No JVD present.    Cardiovascular:  Normal rate, regular rhythm and normal heart sounds with no murmur.  Pulmonary/Chest:  No respiratory distress, no wheezes, no crackles, with normal breath sounds and good air movement.  Abdominal:  Soft.  Bowel sounds are normal.  No distension and no tenderness.   Musculoskeletal:  No edema, no tenderness, and no deformity.    Neurological:  Alert and oriented to person, place, and time.  No cranial nerve deficit.  No tongue deviation.    Skin:  Skin is warm and dry. No rash noted. No pallor.   Peripheral vascular:  no clubbing, no cyanosis, no edema.    Discharge Disposition:  Left Against Medical Advice    Discharge Medications:     Discharge Medications      ASK your doctor about these medications      Instructions Start Date   AMBIEN 5 MG tablet  Generic drug:  zolpidem   Take 5 mg by mouth Every Night.      aspirin 325 MG tablet   Take 325 mg by mouth Daily.      budesonide-formoterol 160-4.5 MCG/ACT inhaler  Commonly known as:  SYMBICORT   Inhale 1 puff 2 (Two) Times a Day.      clonazePAM 0.5 MG tablet  Commonly known as:  KlonoPIN   Take 0.5 mg by mouth 2 (Two) Times a Day.      DULoxetine 60 MG capsule  Commonly known as:  CYMBALTA   Take 60 mg by mouth Every Night.      gabapentin 300 MG capsule  Commonly known as:  NEURONTIN   Take 300 mg by mouth 3 (Three) Times a Day.      ipratropium-albuterol  MCG/ACT " inhaler  Commonly known as:  COMBIVENT RESPIMAT   Inhale 1 puff 4 (Four) Times a Day.      lisinopril 10 MG tablet  Commonly known as:  PRINIVIL,ZESTRIL   Take 10 mg by mouth Daily.      loratadine 10 MG tablet  Commonly known as:  CLARITIN   Take 10 mg by mouth Daily.      metFORMIN 500 MG tablet  Commonly known as:  GLUCOPHAGE   Take 500 mg by mouth Every Night.      metoclopramide 10 MG tablet  Commonly known as:  REGLAN   Take 10 mg by mouth 3 (Three) Times a Day Before Meals.      metoprolol succinate XL 50 MG 24 hr tablet  Commonly known as:  TOPROL-XL   Take 50 mg by mouth Daily.      pantoprazole 40 MG EC tablet  Commonly known as:  PROTONIX   Take 40 mg by mouth Daily.      rOPINIRole 1 MG tablet  Commonly known as:  REQUIP   Take 1 mg by mouth Every Night. Take 1 hour before bedtime.             Discharge Diet: her usual diet    Activity at Discharge: as tolerated    Discharge Care Plan/Instructions:   See PCP as needed    Follow-up Appointments:   Future Appointments  Date Time Provider Department Center   7/11/2018 1:40 PM Erwin Su MD [facility] None       Evaristo Rivera MD  06/16/18  7:37 PM    Time: <30 min

## 2018-06-16 NOTE — PROGRESS NOTES
[facility] Medicine Services  INPATIENT PROGRESS NOTE    Length of Stay: 0  Date of Admission: 6/14/2018  Primary Care Physician: GRAHAM Herrera    Subjective   Chief Complaint: None. Awaiting stress test   HPI:Had a quiet night. No chest pain; no fever or chills. NPO since MN.    Review of Systems   All pertinent negatives and positives are as above. All other systems have been reviewed and are negative unless otherwise stated.     Objective    Temp:  [97.4 °F (36.3 °C)-98.4 °F (36.9 °C)] 98.4 °F (36.9 °C)  Heart Rate:  [70-82] 71  Resp:  [18-20] 18  BP: ()/(42-76) 111/76  Physical exam:  Constitutional:  Well-developed and well-nourished.  No respiratory distress.      HENT:  Head:  Normocephalic and atraumatic.  Mouth:  Moist mucous membranes.    Eyes:  Conjunctivae and EOM are normal.  Pupils are equal, round.  No scleral icterus.    Neck:  Neck supple.  No JVD present.    Cardiovascular:  Normal rate, regular rhythm and normal heart sounds with no murmur.  Pulmonary/Chest:  No respiratory distress, no wheezes, no crackles, with normal breath sounds and good air movement.  Abdominal:  Soft.  Bowel sounds are normal.  No distension and no tenderness.   Musculoskeletal:  No edema, no tenderness, and no deformity.    Neurological:  Alert and oriented to person, place, and time.  No cranial nerve deficit.  No tongue deviation.    Skin:  Skin is warm and dry. No rash noted. No pallor.   Peripheral vascular:  no clubbing, no cyanosis, no edema.       Results Review:  I have reviewed the labs, radiology results, and diagnostic studies.    Laboratory Data:   Lab Results (last 24 hours)     Procedure Component Value Units Date/Time    POC Glucose Once [[id number]]  (Normal) Collected:  06/16/18 0628    Specimen:  Blood Updated:  06/16/18 0654     Glucose 91 mg/dL     Narrative:       Meter: [id number] : [id number] craig    Osmolality, Calculated [[id number]]   (Normal) Collected:  06/16/18 0112    Specimen:  Blood Updated:  06/16/18 0316     Osmolality Calc 275.4 mOsm/kg     Basic Metabolic Panel [[id number]]  (Abnormal) Collected:  06/16/18 0112    Specimen:  Blood Updated:  06/16/18 0316     Glucose 78 mg/dL      BUN 10 mg/dL      Creatinine 0.79 mg/dL      Sodium 139 mmol/L      Potassium 4.8 mmol/L      Comment: 1+ Hemolysis         Chloride 115 (H) mmol/L      CO2 22.8 (L) mmol/L      Calcium 7.9 mg/dL      eGFR Non African Amer 76 mL/min/1.73      BUN/Creatinine Ratio 12.7     Anion Gap 1.2 (L) mmol/L     Narrative:       GFR Normal >60  Chronic Kidney Disease <60  Kidney Failure <15    CBC & Differential [[id number]] Collected:  06/16/18 0112    Specimen:  Blood Updated:  06/16/18 0235    Narrative:       The following orders were created for panel order CBC & Differential.  Procedure                               Abnormality         Status                     ---------                               -----------         ------                     CBC Auto Differential[[id number]]        Abnormal            Final result                 Please view results for these tests on the individual orders.    CBC Auto Differential [[id number]]  (Abnormal) Collected:  06/16/18 0112    Specimen:  Blood Updated:  06/16/18 0235     WBC 5.44 10*3/mm3      RBC 4.12 (L) 10*6/mm3      Hemoglobin 12.7 g/dL      Hematocrit 39.1 %      MCV 94.9 (H) fL      MCH 30.8 pg      MCHC 32.5 (L) g/dL      RDW 13.0 %      RDW-SD 43.8 fl      MPV 10.9 (H) fL      Platelets 196 10*3/mm3      Neutrophil % 48.5 %      Lymphocyte % 40.1 %      Monocyte % 7.4 %      Eosinophil % 2.9 %      Basophil % 0.9 %      Immature Grans % 0.2 %      Neutrophils, Absolute 2.64 10*3/mm3      Lymphocytes, Absolute 2.18 10*3/mm3      Monocytes, Absolute 0.40 10*3/mm3      Eosinophils, Absolute 0.16 10*3/mm3      Basophils, Absolute 0.05 10*3/mm3      Immature Grans, Absolute 0.01 10*3/mm3     Urine Drug Screen - Urine,  Clean Catch [[id number]]  (Normal) Collected:  06/15/18 2200    Specimen:  Urine from Urine, Clean Catch Updated:  06/15/18 2224     Amphetamine Screen, Urine Negative     Barbiturates Screen, Urine Negative     Benzodiazepine Screen, Urine Negative     Cocaine Screen, Urine Negative     Methadone Screen, Urine Negative     Opiate Screen Negative     Phencyclidine (PCP), Urine Negative     THC, Screen, Urine Negative     6-ACETYL MORPHINE Negative     Buprenorphine, Screen, Urine Negative     Oxycodone Screen, Urine Negative    Narrative:       Negative Thresholds For Drugs Screened:                  Amphetamines              1000 ng/ml               Barbiturates               200 ng/ml               Benzodiazepines            200 ng/ml              Cocaine                    300 ng/ml              Methadone                  300 ng/ml              Opiates                    300 ng/ml               Phencyclidine               25 ng/ml               THC                         50 ng/ml              6-Acetyl Morphine           10 ng/ml              Buprenorphine                5 ng/ml              Oxycodone                  300 ng/ml    The reference range for all drugs tested is negative. This report includes final unconfirmed qualitative results to be used for medical treatment purposes only. Unconfirmed results must not be used for non-medical purposes such as employment or legal testing. Clinical consideration should be applied to any drug of abuse test, especially when unconfirmed quantitative results are used.      POC Glucose Once [[id number]]  (Normal) Collected:  06/15/18 1958    Specimen:  Blood Updated:  06/15/18 2008     Glucose 106 mg/dL     Narrative:       Meter: [id number] : [id number] VAISHALI NEELY    POC Glucose Once [[id number]]  (Normal) Collected:  06/15/18 1609    Specimen:  Blood Updated:  06/15/18 1631     Glucose 104 mg/dL     Narrative:       Meter: [id number] : [id number] bharati  rafa    POC Glucose Once [[id number]]  (Normal) Collected:  06/15/18 1131    Specimen:  Blood Updated:  06/15/18 1153     Glucose 88 mg/dL     Narrative:       Meter: [id number] : [id number] bharati rafa          Culture Data:   No results found for: BLOODCX  No results found for: URINECX  No results found for: RESPCX  No results found for: WOUNDCX  No results found for: STOOLCX  No components found for: BODYFLD    Radiology Data:   Imaging Results (last 24 hours)     ** No results found for the last 24 hours. **          I have reviewed the patient current medications.     Assessment/Plan     Assessment and Plan:   -Chest pain r/o MI: chest pain---resolved; troponin normal    -Chronic left hip pain:   -Diabetes mellitus type II,   -COPD, stable without exacerbation:      Patient will undergo a cardiac stress test today. Further management will depend on her test results.    She still uses tobacco products. Patient was counseled to quit.  Continue all other aspects of care.  D/C later today        Discharge Planning: I expect patient to be discharged today    Evaristo Rivera MD   06/16/18   9:07 AM

## 2018-06-16 NOTE — PROGRESS NOTES
TODAY'S DATE   06/16/18    Chief Complaint: Atypical chest pain and left hip pain    SUBJECTIVE:  Stress nuclear test was performed today.  They showed fixed lateral defect.  EKGs and enzymes were normal.  She does not have any more chest pain.  It lasted only for a few minutes    PHYSICAL EXAM:    General Appearance:    Alert, cooperative, in no acute distress   Head:    Normocephalic, without obvious abnormality, atraumatic       Neck:   Supple, trachea midline, no JVD   Lungs:     Clear to auscultation,respirations regular, even and                  unlabored    Heart:    Regular rhythm and normal rate, normal S1 and S2, no            murmur, no gallop, no rub, no click   Chest Wall:    No abnormalities observed   Abdomen:     Normal bowel sounds,  non-distended   Extremities:   Moves all extremities well, no edema, no cyanosis, no             redness   Pulses:   Pulses palpable and equal bilaterally   Skin:   No bleeding, bruising or rash   Neurologic:   A & O x 3            Allergies   Allergen Reactions   • Codeine Hives   • Toradol [Ketorolac Tromethamine] Hives       Past Medical History:   Diagnosis Date   • Acid reflux    • Anxiety    • Arthritis    • Asthma    • Back pain    • Chest pain     OCURS WHEN HAS A BREATHING  ATTACK   • Chronic headaches    • COPD (chronic obstructive pulmonary disease)    • Depression    • Diabetes mellitus    • Emphysema of lung    • Emphysema of lung    • Heartburn    • History of pneumonia    • Hypertension    • Injury of back    • Migraine    • Pneumonia    • Seizures    • Snores    • Stroke     1996       Past Surgical History:   Procedure Laterality Date   • COLONOSCOPY     • KIDNEY SURGERY      cyst removal   • PORTACATH PLACEMENT      left subclavian   • SINUS SURGERY     • VENOUS ACCESS DEVICE (PORT) INSERTION AND REMOVAL N/A 4/12/2018    Procedure: INSERTION AND REMOVAL OF VENOUS ACCESS DEVICE;  Surgeon: Evaristo Stanley MD;  Location: [facility];  Service: General        Social History     Social History   • Marital status: Single     Spouse name: N/A   • Number of children: N/A   • Years of education: N/A     Occupational History   • Not on file.     Social History Main Topics   • Smoking status: Current Every Day Smoker     Packs/day: 1.00     Years: 30.00     Types: Cigarettes   • Smokeless tobacco: Never Used      Comment: PATIENT STATES SHE HAS CUT BACK SLOWLY   • Alcohol use Yes      Comment: a few times per month she has mixed drinks    • Drug use: No   • Sexual activity: Defer     Other Topics Concern   • Not on file     Social History Narrative   • No narrative on file       Family History   Problem Relation Age of Onset   • COPD Mother    • Diabetes Mother    • Diabetes Father    • Heart disease Father    • Cancer Maternal Uncle    • Heart disease Maternal Uncle    • Cancer Paternal Uncle        Patient Active Problem List   Diagnosis   • Chest pain   • Mixed simple and mucopurulent chronic bronchitis   • Poor venous access        Vital Signs (last 24 hours)       06/15 0700  -  06/16 0659 06/16 0700  -  06/16 1312   Most Recent    Temp (°F) 97.4 -  98.4       98.4 (36.9)    Heart Rate 70 -  82      93     93    Resp 18 -  20      18     18    BP (!)71/42 -  111/76      124/82     124/82    SpO2 (%) 94 -  98      95     95          LABS:      Results from last 7 days  Lab Units 06/16/18  0112 06/15/18  0423 06/14/18  1407 06/12/18  1040   WBC 10*3/mm3 5.44 5.67 6.11 6.76   HEMOGLOBIN g/dL 12.7 12.8 14.3 15.4   PLATELETS 10*3/mm3 196 192 199 229       Results from last 7 days  Lab Units 06/16/18  0112 06/14/18  2332 06/14/18  1407   SODIUM mmol/L 139 138 137   POTASSIUM mmol/L 4.8 4.0 3.8   CHLORIDE mmol/L 115* 108 111   CO2 mmol/L 22.8* 25.7 22.5*   BUN mg/dL 10 16 11   CREATININE mg/dL 0.79 0.97 0.96   CALCIUM mg/dL 7.9 8.5 8.7   GLUCOSE mg/dL 78 97 94       Results from last 7 days  Lab Units 06/14/18  1407   BILIRUBIN mg/dL 0.3   ALK PHOS U/L 93   AST (SGOT) U/L  16   ALT (SGPT) U/L 7*           Results from last 7 days  Lab Units 06/14/18  1407   INR  0.91       Results from last 7 days  Lab Units 06/15/18  0423 06/14/18  2332 06/14/18  1729   CK TOTAL U/L 77 84 88   TROPONIN I ng/mL <0.006 <0.006 <0.006   CKMB ng/mL 1.89 2.00 1.40       No results found for: HGBA1C  Glucose   Date/Time Value Ref Range Status   06/16/2018 1222 89 70 - 130 mg/dL Final   06/16/2018 0628 91 70 - 130 mg/dL Final   06/15/2018 1958 106 70 - 130 mg/dL Final   06/15/2018 1609 104 70 - 130 mg/dL Final   06/15/2018 1131 88 70 - 130 mg/dL Final   06/15/2018 0716 88 70 - 130 mg/dL Final   06/14/2018 1806 107 70 - 130 mg/dL Final       Hospital Medications:      aspirin 325 mg Oral Daily   atorvastatin 40 mg Oral Nightly   budesonide-formoterol 1 puff Inhalation BID - RT   cetirizine 10 mg Oral Daily   DULoxetine 60 mg Oral Nightly   famotidine 20 mg Oral BID   heparin (porcine) 5,000 Units Subcutaneous Q12H   insulin aspart 0-7 Units Subcutaneous 4x Daily AC & at Bedtime   ipratropium-albuterol 3 mL Nebulization 4x Daily - RT   lisinopril 10 mg Oral Daily   metFORMIN 500 mg Oral Nightly   metoclopramide 10 mg Oral TID AC   metoprolol succinate XL 50 mg Oral Daily   nicotine 1 patch Transdermal Q24H   pantoprazole 40 mg Oral Daily   rOPINIRole 1 mg Oral Nightly       sodium chloride 75 mL/hr Last Rate: 75 mL/hr (06/16/18 0719)       A/P: Atypical chest pain with fixed lateral defect.  Chest pain is nonischemic.  From cardiac standpoint patient can be discharged      Angel Orozco MD

## 2018-06-16 NOTE — PLAN OF CARE
Problem: Patient Care Overview  Goal: Plan of Care Review  Outcome: Ongoing (interventions implemented as appropriate)   06/15/18 1002   Plan of Care Review   Progress no change   Coping/Psychosocial   Plan of Care Reviewed With patient     Goal: Individualization and Mutuality  Outcome: Ongoing (interventions implemented as appropriate)      Problem: Pain, Acute (Adult)  Goal: Identify Related Risk Factors and Signs and Symptoms  Outcome: Ongoing (interventions implemented as appropriate)    Goal: Acceptable Pain Control/Comfort Level  Outcome: Ongoing (interventions implemented as appropriate)

## 2018-07-11 ENCOUNTER — OFFICE VISIT (OUTPATIENT)
Dept: PULMONOLOGY | Facility: CLINIC | Age: 58
End: 2018-07-11

## 2018-07-11 VITALS
SYSTOLIC BLOOD PRESSURE: 122 MMHG | DIASTOLIC BLOOD PRESSURE: 86 MMHG | HEIGHT: 55 IN | BODY MASS INDEX: 42.35 KG/M2 | HEART RATE: 107 BPM | WEIGHT: 183 LBS | OXYGEN SATURATION: 94 % | TEMPERATURE: 98.1 F

## 2018-07-11 DIAGNOSIS — Z72.0 CURRENT NICOTINE USE: ICD-10-CM

## 2018-07-11 DIAGNOSIS — Z87.891 PERSONAL HISTORY OF NICOTINE DEPENDENCE: ICD-10-CM

## 2018-07-11 DIAGNOSIS — G47.33 OSA (OBSTRUCTIVE SLEEP APNEA): ICD-10-CM

## 2018-07-11 DIAGNOSIS — E66.01 MORBID OBESITY WITH BMI OF 40.0-44.9, ADULT (HCC): ICD-10-CM

## 2018-07-11 DIAGNOSIS — J41.1 MUCOPURULENT CHRONIC BRONCHITIS (HCC): Primary | ICD-10-CM

## 2018-07-11 PROCEDURE — 99204 OFFICE O/P NEW MOD 45 MIN: CPT | Performed by: INTERNAL MEDICINE

## 2018-07-11 NOTE — PROGRESS NOTES
Subjective   Marissa Lau is a 57 y.o. female who is being seen for COPD; Asthma; and Emphysema    History of Present Illness     This 57-year-old female who has been referred to us by her primary care provider for evaluation of ongoing shortness of breath.  Patient tells me that she is having trouble breathing for quite some time but lately over the past year or so this has been increasing progressively.  She said that she is been to Hospital numerous times over the past year, had multiple admissions including ICU admission.  Currently she uses oxygen 24 hours and also using multiple inhalers and nebulizers.  Despite that her symptoms are not under control.  On questioning she admits that she is still smoking but is cutting down.  Other symptoms includes fragmented sleep, unrefreshed feeling in the morning time and increased daytime fatigue and sleepiness.    Past Medical History:   Diagnosis Date   • Acid reflux    • Anxiety    • Arthritis    • Asthma    • Back pain    • Chest pain     OCURS WHEN HAS A BREATHING  ATTACK   • Chronic headaches    • COPD (chronic obstructive pulmonary disease) (CMS/HCC)    • Depression    • Diabetes mellitus (CMS/HCC)    • Emphysema of lung (CMS/HCC)    • Emphysema of lung (CMS/HCC)    • Heartburn    • History of pneumonia    • Hypertension    • Injury of back    • Migraine    • Pneumonia    • Seizures (CMS/HCC)    • Snores    • Stroke (CMS/HCC)     1996     Past Surgical History:   Procedure Laterality Date   • COLONOSCOPY     • KIDNEY SURGERY      cyst removal   • PORTACATH PLACEMENT      left subclavian   • SINUS SURGERY     • VENOUS ACCESS DEVICE (PORT) INSERTION AND REMOVAL N/A 4/12/2018    Procedure: INSERTION AND REMOVAL OF VENOUS ACCESS DEVICE;  Surgeon: Evaristo Stanley MD;  Location: [facility];  Service: General     Family History   Problem Relation Age of Onset   • COPD Mother    • Diabetes Mother    • Diabetes Father    • Heart disease Father    • Cancer Maternal Uncle   "  • Heart disease Maternal Uncle    • Cancer Paternal Uncle       reports that she has been smoking Cigarettes.  She has a 30.00 pack-year smoking history. She has never used smokeless tobacco. She reports that she drinks alcohol. She reports that she does not use drugs.  Allergies   Allergen Reactions   • Codeine Hives   • Toradol [Ketorolac Tromethamine] Hives           Patient has not received a flu shot or a pneumonia vaccination.     The following portions of the patient's history were reviewed and updated as appropriate: allergies, current medications, past family history, past medical history, past social history, past surgical history and problem list.    Review of Systems   Constitutional: Positive for fatigue.   HENT: Positive for congestion.    Respiratory: Positive for cough, chest tightness, shortness of breath and wheezing.    Cardiovascular: Positive for leg swelling.   Allergic/Immunologic: Positive for environmental allergies.   All other systems reviewed and are negative.      Objective   /86   Pulse 107   Temp 98.1 °F (36.7 °C) (Oral)   Ht 139.7 cm (55\")   Wt 83 kg (183 lb)   SpO2 94%   BMI 42.53 kg/m²   Physical Exam   Constitutional: She is oriented to person, place, and time.   HENT:   Head: Normocephalic and atraumatic.   Eyes: EOM are normal. Pupils are equal, round, and reactive to light.   Neck: Neck supple.   Cardiovascular: Normal rate and regular rhythm.  PMI is displaced.    Murmur heard.   Systolic murmur is present with a grade of 2/6   murmur at mitral area   Pulmonary/Chest: She has rales.   Bibasilar rales. Few distant and scattered rhonchi heard posteriorly    Abdominal: Soft. Bowel sounds are normal.   Musculoskeletal: She exhibits edema. She exhibits no deformity.    1-2+ pitting leg edema bilaterally   Neurological: She is alert and oriented to person, place, and time.   Skin: Skin is warm and dry.   Psychiatric: She has a normal mood and affect. Her behavior is " normal.   Nursing note and vitals reviewed.        Radiology:  Xr Chest 1 View    Result Date: 6/14/2018  Stable appearance of the chest.  This report was finalized on 6/14/2018 2:25 PM by Dr. Win Daly MD.      Xr Chest Ap    Result Date: 4/12/2018  Right peripherally tip in SVC.  This report was finalized on 4/12/2018 9:35 AM by Dr. Win Daly MD.      Fl Surgery Fluoro    Result Date: 4/12/2018  As above  This report was finalized on 4/12/2018 8:48 AM by Dr. Win Daly MD.      Xr Hip With Or Without Pelvis 2 - 3 View Left    Result Date: 6/14/2018  No acute displaced fracture.  This report was finalized on 6/14/2018 2:43 PM by Dr. Win Daly MD.        Lab Results:  Admission on 06/14/2018, Discharged on 06/16/2018   Component Date Value Ref Range Status   • Lipase 06/14/2018 36  13 - 60 U/L Final   • BNP 06/14/2018 7.0  0.0 - 100.0 pg/mL Final   • Glucose 06/14/2018 94  70 - 110 mg/dL Final   • BUN 06/14/2018 11  7 - 21 mg/dL Final   • Creatinine 06/14/2018 0.96  0.43 - 1.29 mg/dL Final   • Sodium 06/14/2018 137  135 - 153 mmol/L Final   • Potassium 06/14/2018 3.8  3.5 - 5.3 mmol/L Final   • Chloride 06/14/2018 111  99 - 112 mmol/L Final   • CO2 06/14/2018 22.5* 24.3 - 31.9 mmol/L Final   • Calcium 06/14/2018 8.7  7.7 - 10.0 mg/dL Final   • Total Protein 06/14/2018 6.7  6.0 - 8.0 g/dL Final   • Albumin 06/14/2018 3.80  3.50 - 5.00 g/dL Final   • ALT (SGPT) 06/14/2018 7* 10 - 36 U/L Final   • AST (SGOT) 06/14/2018 16  10 - 30 U/L Final   • Alkaline Phosphatase 06/14/2018 93  35 - 104 U/L Final   • Total Bilirubin 06/14/2018 0.3  0.2 - 1.8 mg/dL Final   • eGFR Non African Amer 06/14/2018 60* >60 mL/min/1.73 Final   • Globulin 06/14/2018 2.9  gm/dL Final   • A/G Ratio 06/14/2018 1.3* 1.5 - 2.5 g/dL Final   • BUN/Creatinine Ratio 06/14/2018 11.5  7.0 - 25.0 Final   • Anion Gap 06/14/2018 3.5* 3.6 - 11.2 mmol/L Final   • Troponin I 06/14/2018 <0.006  <=0.040 ng/mL Final   • Protime 06/14/2018 12.5  11.0  - 15.4 Seconds Final   • INR 06/14/2018 0.91  0.90 - 1.10 Final   • Extra Tube 06/14/2018 hold for add-on   Final   • Extra Tube 06/14/2018 Hold for add-ons.   Final   • Extra Tube 06/14/2018 hold for add-on   Final   • Extra Tube 06/14/2018 Hold for add-ons.   Final   • WBC 06/14/2018 6.11  4.50 - 12.50 10*3/mm3 Final   • RBC 06/14/2018 4.60  4.20 - 5.40 10*6/mm3 Final   • Hemoglobin 06/14/2018 14.3  12.0 - 16.0 g/dL Final   • Hematocrit 06/14/2018 44.2  37.0 - 47.0 % Final   • MCV 06/14/2018 96.1* 80.0 - 94.0 fL Final   • MCH 06/14/2018 31.1  27.0 - 33.0 pg Final   • MCHC 06/14/2018 32.4* 33.0 - 37.0 g/dL Final   • RDW 06/14/2018 13.3  11.5 - 14.5 % Final   • RDW-SD 06/14/2018 47.2  37.0 - 54.0 fl Final   • MPV 06/14/2018 10.3* 6.0 - 10.0 fL Final   • Platelets 06/14/2018 199  130 - 400 10*3/mm3 Final   • D-Dimer, Quantitative 06/14/2018 0.33  0.00 - 0.50 MCGFEU/mL Final   • Scan Slide 06/14/2018    Final   • Osmolality Calc 06/14/2018 273.0  273.0 - 305.0 mOsm/kg Final   • Neutrophil % 06/14/2018 51.0  30.0 - 70.0 % Final   • Lymphocyte % 06/14/2018 39.0  21.0 - 51.0 % Final   • Monocyte % 06/14/2018 5.0  0.0 - 10.0 % Final   • Eosinophil % 06/14/2018 4.0  0.0 - 5.0 % Final   • Bands %  06/14/2018 1.0* 4.0 - 12.0 % Final   • Neutrophils Absolute 06/14/2018 3.18  1.40 - 6.50 10*3/mm3 Final   • Lymphocytes Absolute 06/14/2018 2.38  1.00 - 3.00 10*3/mm3 Final   • Monocytes Absolute 06/14/2018 0.31  0.10 - 0.90 10*3/mm3 Final   • Eosinophils Absolute 06/14/2018 0.24  0.00 - 0.70 10*3/mm3 Final   • Hypochromia 06/14/2018 Slight/1+  None Seen Final   • Macrocytes 06/14/2018 Slight/1+  None Seen Final   • Platelet Morphology 06/14/2018 Normal  Normal Final   • CKMB 06/14/2018 1.40  0.00 - 5.00 ng/mL Final   • Creatine Kinase 06/14/2018 88  24 - 173 U/L Final   • CKMB 06/14/2018 2.00  0.00 - 5.00 ng/mL Final   • Creatine Kinase 06/14/2018 84  24 - 173 U/L Final   • Troponin I 06/14/2018 <0.006  <=0.040 ng/mL Final   •  Troponin I 06/14/2018 <0.006  <=0.040 ng/mL Final   • Glucose 06/14/2018 107  70 - 130 mg/dL Final   • CK-MB Index 06/14/2018 1.6  0.0 - 3.0 % Final   • CKMB 06/15/2018 1.89  0.00 - 5.00 ng/mL Final   • Creatine Kinase 06/15/2018 77  24 - 173 U/L Final   • Troponin I 06/15/2018 <0.006  <=0.040 ng/mL Final   • Glucose 06/14/2018 97  70 - 110 mg/dL Final   • BUN 06/14/2018 16  7 - 21 mg/dL Final   • Creatinine 06/14/2018 0.97  0.43 - 1.29 mg/dL Final   • Sodium 06/14/2018 138  135 - 153 mmol/L Final   • Potassium 06/14/2018 4.0  3.5 - 5.3 mmol/L Final   • Chloride 06/14/2018 108  99 - 112 mmol/L Final   • CO2 06/14/2018 25.7  24.3 - 31.9 mmol/L Final   • Calcium 06/14/2018 8.5  7.7 - 10.0 mg/dL Final   • eGFR Non African Amer 06/14/2018 60* >60 mL/min/1.73 Final   • BUN/Creatinine Ratio 06/14/2018 16.5  7.0 - 25.0 Final   • Anion Gap 06/14/2018 4.3  3.6 - 11.2 mmol/L Final   • WBC 06/15/2018 5.67  4.50 - 12.50 10*3/mm3 Final   • RBC 06/15/2018 4.09* 4.20 - 5.40 10*6/mm3 Final   • Hemoglobin 06/15/2018 12.8  12.0 - 16.0 g/dL Final   • Hematocrit 06/15/2018 39.3  37.0 - 47.0 % Final   • MCV 06/15/2018 96.1* 80.0 - 94.0 fL Final   • MCH 06/15/2018 31.3  27.0 - 33.0 pg Final   • MCHC 06/15/2018 32.6* 33.0 - 37.0 g/dL Final   • RDW 06/15/2018 13.1  11.5 - 14.5 % Final   • RDW-SD 06/15/2018 44.0  37.0 - 54.0 fl Final   • MPV 06/15/2018 9.5  6.0 - 10.0 fL Final   • Platelets 06/15/2018 192  130 - 400 10*3/mm3 Final   • Neutrophil % 06/15/2018 40.0  30.0 - 70.0 % Final   • Lymphocyte % 06/15/2018 45.3  21.0 - 51.0 % Final   • Monocyte % 06/15/2018 9.2  0.0 - 10.0 % Final   • Eosinophil % 06/15/2018 3.9  0.0 - 5.0 % Final   • Basophil % 06/15/2018 1.4  0.0 - 2.0 % Final   • Immature Grans % 06/15/2018 0.2  0.0 - 0.5 % Final   • Neutrophils, Absolute 06/15/2018 2.27  1.40 - 6.50 10*3/mm3 Final   • Lymphocytes, Absolute 06/15/2018 2.57  1.00 - 3.00 10*3/mm3 Final   • Monocytes, Absolute 06/15/2018 0.52  0.10 - 0.90 10*3/mm3  Final   • Eosinophils, Absolute 06/15/2018 0.22  0.00 - 0.70 10*3/mm3 Final   • Basophils, Absolute 06/15/2018 0.08  0.00 - 0.30 10*3/mm3 Final   • Immature Grans, Absolute 06/15/2018 0.01  0.00 - 0.03 10*3/mm3 Final   • CK-MB Index 06/14/2018 2.4  0.0 - 3.0 % Final   • Osmolality Calc 06/14/2018 276.8  273.0 - 305.0 mOsm/kg Final   • CK-MB Index 06/15/2018 2.5  0.0 - 3.0 % Final   • Glucose 06/15/2018 88  70 - 130 mg/dL Final   • Glucose 06/15/2018 88  70 - 130 mg/dL Final   • Nuclear Prior Study 06/16/2018 3   Final   • Target HR (85%) 06/16/2018 140  bpm Final   • Max. Pred. HR (100%) 06/16/2018 165  bpm Final   • BH CV STRESS PROTOCOL 1 06/16/2018 Pharmacologic   Final   • Stage 1 06/16/2018 1   Final   • HR Stage 1 06/16/2018 99   Final   • BP Stage 1 06/16/2018 139/77   Final   • Duration Min Stage 1 06/16/2018 0   Final   • Duration Sec Stage 1 06/16/2018 10   Final   • Stress Dose Regadenoson Stage 1 06/16/2018 0.4   Final   • Stress Comments Stage 1 06/16/2018 10 sec bolus injection   Final   • Stage 2 06/16/2018 2   Final   • HR Stage 2 06/16/2018 85   Final   • BP Stage 2 06/16/2018 122/71   Final   • Duration Min Stage 2 06/16/2018 4   Final   • Duration Sec Stage 2 06/16/2018 0   Final   • Stress Comments Stage 2 06/16/2018 recovery   Final   • Baseline HR 06/16/2018 75  bpm Final   • Baseline BP 06/16/2018 114/72  mmHg Final   • Peak HR 06/16/2018 99  bpm Final   • Percent Max Pred HR 06/16/2018 60.00  % Final   • Percent Target HR 06/16/2018 71  % Final   • Peak BP 06/16/2018 139/77  mmHg Final   • Recovery HR 06/16/2018 85  bpm Final   • Recovery BP 06/16/2018 122/71  mmHg Final   • Nuc Stress EF 06/16/2018 69  % Final   • Glucose 06/15/2018 104  70 - 130 mg/dL Final   • Glucose 06/15/2018 106  70 - 130 mg/dL Final   • Amphetamine Screen, Urine 06/15/2018 Negative  Negative Final   • Barbiturates Screen, Urine 06/15/2018 Negative  Negative Final   • Benzodiazepine Screen, Urine 06/15/2018 Negative   Negative Final   • Cocaine Screen, Urine 06/15/2018 Negative  Negative Final   • Methadone Screen, Urine 06/15/2018 Negative  Negative Final   • Opiate Screen 06/15/2018 Negative  Negative Final   • Phencyclidine (PCP), Urine 06/15/2018 Negative  Negative Final   • THC, Screen, Urine 06/15/2018 Negative  Negative Final   • 6-ACETYL MORPHINE 06/15/2018 Negative  Negative Final   • Buprenorphine, Screen, Urine 06/15/2018 Negative  Negative Final   • Oxycodone Screen, Urine 06/15/2018 Negative  Negative Final   • Glucose 06/16/2018 78  70 - 110 mg/dL Final   • BUN 06/16/2018 10  7 - 21 mg/dL Final   • Creatinine 06/16/2018 0.79  0.43 - 1.29 mg/dL Final   • Sodium 06/16/2018 139  135 - 153 mmol/L Final   • Potassium 06/16/2018 4.8  3.5 - 5.3 mmol/L Final   • Chloride 06/16/2018 115* 99 - 112 mmol/L Final   • CO2 06/16/2018 22.8* 24.3 - 31.9 mmol/L Final   • Calcium 06/16/2018 7.9  7.7 - 10.0 mg/dL Final   • eGFR Non African Amer 06/16/2018 76  >60 mL/min/1.73 Final   • BUN/Creatinine Ratio 06/16/2018 12.7  7.0 - 25.0 Final   • Anion Gap 06/16/2018 1.2* 3.6 - 11.2 mmol/L Final   • WBC 06/16/2018 5.44  4.50 - 12.50 10*3/mm3 Final   • RBC 06/16/2018 4.12* 4.20 - 5.40 10*6/mm3 Final   • Hemoglobin 06/16/2018 12.7  12.0 - 16.0 g/dL Final   • Hematocrit 06/16/2018 39.1  37.0 - 47.0 % Final   • MCV 06/16/2018 94.9* 80.0 - 94.0 fL Final   • MCH 06/16/2018 30.8  27.0 - 33.0 pg Final   • MCHC 06/16/2018 32.5* 33.0 - 37.0 g/dL Final   • RDW 06/16/2018 13.0  11.5 - 14.5 % Final   • RDW-SD 06/16/2018 43.8  37.0 - 54.0 fl Final   • MPV 06/16/2018 10.9* 6.0 - 10.0 fL Final   • Platelets 06/16/2018 196  130 - 400 10*3/mm3 Final   • Neutrophil % 06/16/2018 48.5  30.0 - 70.0 % Final   • Lymphocyte % 06/16/2018 40.1  21.0 - 51.0 % Final   • Monocyte % 06/16/2018 7.4  0.0 - 10.0 % Final   • Eosinophil % 06/16/2018 2.9  0.0 - 5.0 % Final   • Basophil % 06/16/2018 0.9  0.0 - 2.0 % Final   • Immature Grans % 06/16/2018 0.2  0.0 - 0.5 % Final    • Neutrophils, Absolute 06/16/2018 2.64  1.40 - 6.50 10*3/mm3 Final   • Lymphocytes, Absolute 06/16/2018 2.18  1.00 - 3.00 10*3/mm3 Final   • Monocytes, Absolute 06/16/2018 0.40  0.10 - 0.90 10*3/mm3 Final   • Eosinophils, Absolute 06/16/2018 0.16  0.00 - 0.70 10*3/mm3 Final   • Basophils, Absolute 06/16/2018 0.05  0.00 - 0.30 10*3/mm3 Final   • Immature Grans, Absolute 06/16/2018 0.01  0.00 - 0.03 10*3/mm3 Final   • Osmolality Calc 06/16/2018 275.4  273.0 - 305.0 mOsm/kg Final   • Glucose 06/16/2018 91  70 - 130 mg/dL Final   • Glucose 06/16/2018 89  70 - 130 mg/dL Final   • Glucose 06/16/2018 77  70 - 130 mg/dL Final       Assessment      ICD-10-CM ICD-9-CM   1. Mucopurulent chronic bronchitis (CMS/[facility]) J41.1 491.1   2. Current nicotine use Z72.0 305.1   3. Morbid obesity with BMI of 40.0-44.9, adult (CMS/[facility]) E66.01 278.01    Z68.41 V85.41   4. DOLLY (obstructive sleep apnea) G47.33 327.23   5. Personal history of nicotine dependence  Z87.891 V15.82                DISCUSSION:  This 57-year-old current smoker is having frequent admissions because of acute respiratory distress.  She is on Symbicort as well as albuterol nebulizer and Combivent inhaler.  I'm sending her for a pulmonary test for objective assessment of her airways function.  She also qualifies for low-dose CT scan of the chest for cancer screening, we are ordering that.    Patient also has symptoms consistent with obstructive sleep apnea, she would benefit from a nocturnal polysomnography, but she  wants to defer that until her next visit.  We would revisit the issue during her next visit.      Patient's Body mass index is 42.53 kg/m². BMI is above normal parameters. Recommendations include: educational material and exercise counseling.    I advised Marissa of the risks of continuing to use tobacco, and I provided her with tobacco cessation educational materials in the After Visit Summary.     During this visit, I spent 10 minutes counseling the patient  regarding tobacco cessation.      Plan    Orders Placed This Encounter   Procedures   • CT Chest Low Dose Wo   • Blood Gas, Arterial   • Pulmonary Function Test     No orders of the defined types were placed in this encounter.                 Audra Wiseman MD, FCCP, [facility]  Pulmonary, Critical Care, and Sleep Medicine

## 2018-09-16 ENCOUNTER — HOSPITAL ENCOUNTER (EMERGENCY)
Facility: HOSPITAL | Age: 58
Discharge: HOME | End: 2018-09-16
Attending: EMERGENCY MEDICINE | Admitting: EMERGENCY MEDICINE

## 2018-09-16 VITALS
TEMPERATURE: 98.3 F | BODY MASS INDEX: 29.99 KG/M2 | HEART RATE: 86 BPM | WEIGHT: 180 LBS | OXYGEN SATURATION: 98 % | HEIGHT: 65 IN | RESPIRATION RATE: 16 BRPM | SYSTOLIC BLOOD PRESSURE: 125 MMHG | DIASTOLIC BLOOD PRESSURE: 82 MMHG

## 2018-09-16 DIAGNOSIS — S76.012A STRAIN OF FLEXOR MUSCLE OF LEFT HIP, INITIAL ENCOUNTER: Primary | ICD-10-CM

## 2018-09-16 LAB
BILIRUB UR QL STRIP: NEGATIVE
CLARITY UR: CLEAR
COLOR UR: YELLOW
GLUCOSE UR STRIP-MCNC: NEGATIVE MG/DL
HGB UR QL STRIP.AUTO: NEGATIVE
KETONES UR QL STRIP: NEGATIVE
LEUKOCYTE ESTERASE UR QL STRIP.AUTO: NEGATIVE
NITRITE UR QL STRIP: NEGATIVE
PH UR STRIP.AUTO: 7 [PH] (ref 5–8)
PROT UR QL STRIP: NEGATIVE
SP GR UR STRIP: 1.01 (ref 1–1.03)
UROBILINOGEN UR QL STRIP: NORMAL

## 2018-09-16 PROCEDURE — 81003 URINALYSIS AUTO W/O SCOPE: CPT | Performed by: EMERGENCY MEDICINE

## 2018-09-16 PROCEDURE — 96372 THER/PROPH/DIAG INJ SC/IM: CPT

## 2018-09-16 PROCEDURE — 99284 EMERGENCY DEPT VISIT MOD MDM: CPT

## 2018-09-16 PROCEDURE — 25010000002 ORPHENADRINE CITRATE PER 60 MG: Performed by: EMERGENCY MEDICINE

## 2018-09-16 RX ORDER — MEPERIDINE HYDROCHLORIDE 25 MG/ML
25 INJECTION INTRAMUSCULAR; INTRAVENOUS; SUBCUTANEOUS ONCE
Status: COMPLETED | OUTPATIENT
Start: 2018-09-16 | End: 2018-09-16

## 2018-09-16 RX ORDER — ONDANSETRON 4 MG/1
4 TABLET, ORALLY DISINTEGRATING ORAL ONCE
Status: COMPLETED | OUTPATIENT
Start: 2018-09-16 | End: 2018-09-16

## 2018-09-16 RX ORDER — ORPHENADRINE CITRATE 30 MG/ML
60 INJECTION INTRAMUSCULAR; INTRAVENOUS ONCE
Status: COMPLETED | OUTPATIENT
Start: 2018-09-16 | End: 2018-09-16

## 2018-09-16 RX ORDER — HYDROCODONE BITARTRATE AND ACETAMINOPHEN 7.5; 325 MG/1; MG/1
1 TABLET ORAL ONCE
Status: COMPLETED | OUTPATIENT
Start: 2018-09-16 | End: 2018-09-16

## 2018-09-16 RX ORDER — TRAMADOL HYDROCHLORIDE 50 MG/1
50 TABLET ORAL EVERY 6 HOURS PRN
COMMUNITY

## 2018-09-16 RX ORDER — ORPHENADRINE CITRATE 100 MG/1
100 TABLET, EXTENDED RELEASE ORAL 2 TIMES DAILY PRN
Qty: 20 TABLET | Refills: 0 | Status: SHIPPED | OUTPATIENT
Start: 2018-09-16

## 2018-09-16 RX ADMIN — ONDANSETRON 4 MG: 4 TABLET, ORALLY DISINTEGRATING ORAL at 17:54

## 2018-09-16 RX ADMIN — MEPERIDINE HYDROCHLORIDE 25 MG: 25 INJECTION INTRAMUSCULAR; INTRAVENOUS; SUBCUTANEOUS at 19:15

## 2018-09-16 RX ADMIN — ORPHENADRINE CITRATE 60 MG: 30 INJECTION INTRAMUSCULAR; INTRAVENOUS at 17:55

## 2018-09-16 RX ADMIN — HYDROCODONE BITARTRATE AND ACETAMINOPHEN 1 TABLET: 7.5; 325 TABLET ORAL at 17:55

## 2018-09-16 NOTE — ED NOTES
Pt has had left hip pain for several months but worse this past week. It radiates down to the knee.     Masoud Soto RN  09/16/18 [id number]

## 2018-09-16 NOTE — ED PROVIDER NOTES
Subjective     History provided by:  Patient  Lower Extremity Issue   Location:  Hip  Injury: no    Hip location:  L hip  Pain details:     Quality:  Aching    Radiates to:  Does not radiate    Severity:  Moderate    Onset quality:  Gradual    Timing:  Constant    Progression:  Worsening  Chronicity:  Recurrent  Worsened by:  Bearing weight  Associated symptoms: decreased ROM    Associated symptoms: no fever    Risk factors: obesity        Review of Systems   Constitutional: Negative.  Negative for fever.   HENT: Negative.    Respiratory: Positive for shortness of breath and wheezing (chronic copd).    Cardiovascular: Negative.  Negative for chest pain.   Gastrointestinal: Negative.  Negative for abdominal pain.   Endocrine: Negative.    Genitourinary: Negative.  Negative for dysuria.   Musculoskeletal: Positive for myalgias.   Skin: Negative.    Neurological: Negative.    Psychiatric/Behavioral: Negative.    All other systems reviewed and are negative.      Past Medical History:   Diagnosis Date   • Acid reflux    • Anxiety    • Arthritis    • Asthma    • Back pain    • Chest pain     OCURS WHEN HAS A BREATHING  ATTACK   • Chronic headaches    • COPD (chronic obstructive pulmonary disease) (CMS/HCC)    • Depression    • Diabetes mellitus (CMS/HCC)    • Emphysema of lung (CMS/HCC)    • Emphysema of lung (CMS/HCC)    • Heartburn    • History of pneumonia    • Hypertension    • Injury of back    • Migraine    • Pneumonia    • Seizures (CMS/HCC)    • Snores    • Stroke (CMS/HCC)     1996       Allergies   Allergen Reactions   • Codeine Hives   • Toradol [Ketorolac Tromethamine] Hives       Past Surgical History:   Procedure Laterality Date   • COLONOSCOPY     • KIDNEY SURGERY      cyst removal   • PORTACATH PLACEMENT      left subclavian   • SINUS SURGERY     • VENOUS ACCESS DEVICE (PORT) INSERTION AND REMOVAL N/A 4/12/2018    Procedure: INSERTION AND REMOVAL OF VENOUS ACCESS DEVICE;  Surgeon: Evaristo Stanley MD;   [facility];  Service: General       Family History   Problem Relation Age of Onset   • COPD Mother    • Diabetes Mother    • Diabetes Father    • Heart disease Father    • Cancer Maternal Uncle    • Heart disease Maternal Uncle    • Cancer Paternal Uncle        Social History     Social History   • Marital status: Single     Social History Main Topics   • Smoking status: Current Every Day Smoker     Packs/day: 1.00     Years: 30.00     Types: Cigarettes   • Smokeless tobacco: Never Used      Comment: PATIENT STATES SHE HAS CUT BACK SLOWLY   • Alcohol use Yes      Comment: a few times per month she has mixed drinks    • Drug use: No   • Sexual activity: Defer     Other Topics Concern   • Not on file           Objective   Physical Exam   Constitutional: She is oriented to person, place, and time. She appears well-developed and well-nourished. No distress.   HENT:   Head: Normocephalic and atraumatic.   Right Ear: External ear normal.   Left Ear: External ear normal.   Nose: Nose normal.   Eyes: Conjunctivae are normal.   Neck: Normal range of motion. Neck supple. No JVD present. No tracheal deviation present.   Cardiovascular: Normal rate, regular rhythm and normal heart sounds.    No murmur heard.  Pulmonary/Chest: Effort normal. No respiratory distress. She has wheezes.   Abdominal: Soft. Bowel sounds are normal. There is no tenderness.   Musculoskeletal: She exhibits tenderness. She exhibits no deformity.   Moderate pain w/ abduction of the left hip.    Neurological: She is alert and oriented to person, place, and time. No cranial nerve deficit.   Skin: Skin is warm and dry. No rash noted. She is not diaphoretic. No erythema. No pallor.   Psychiatric: She has a normal mood and affect. Her behavior is normal. Thought content normal.   Nursing note and vitals reviewed.      Procedures           ED Course                  MDM  Number of Diagnoses or Management Options  Strain of flexor muscle of left hip,  initial encounter: established and worsening     Amount and/or Complexity of Data Reviewed  Clinical lab tests: reviewed    Risk of Complications, Morbidity, and/or Mortality  Presenting problems: low  Diagnostic procedures: low  Management options: low    Patient Progress  Patient progress: stable        Final diagnoses:   Strain of flexor muscle of left hip, initial encounter            Mason Jimenez PA  09/16/18 [id number]

## 2019-01-22 ENCOUNTER — TELEPHONE (OUTPATIENT)
Dept: SURGERY | Facility: CLINIC | Age: 59
End: 2019-01-22

## 2019-01-22 NOTE — TELEPHONE ENCOUNTER
Tried calling patient to reschedule. [employer] Customer not available and no option to leave a message.
